# Patient Record
Sex: FEMALE | Race: WHITE | HISPANIC OR LATINO | Employment: OTHER | URBAN - METROPOLITAN AREA
[De-identification: names, ages, dates, MRNs, and addresses within clinical notes are randomized per-mention and may not be internally consistent; named-entity substitution may affect disease eponyms.]

---

## 2017-10-30 RX ORDER — ZOLPIDEM TARTRATE 5 MG/1
5 TABLET ORAL NIGHTLY PRN
COMMUNITY
End: 2017-10-31 | Stop reason: SDUPTHER

## 2017-10-30 RX ORDER — ACETAMINOPHEN AND CODEINE PHOSPHATE 120; 12 MG/5ML; MG/5ML
1 SOLUTION ORAL DAILY
COMMUNITY
End: 2018-04-23

## 2017-10-30 RX ORDER — VENLAFAXINE 37.5 MG/1
37.5 TABLET ORAL 2 TIMES DAILY
COMMUNITY
End: 2017-10-31

## 2017-10-31 ENCOUNTER — OFFICE VISIT (OUTPATIENT)
Dept: FAMILY MEDICINE | Facility: CLINIC | Age: 52
End: 2017-10-31
Payer: MEDICAID

## 2017-10-31 VITALS
SYSTOLIC BLOOD PRESSURE: 112 MMHG | BODY MASS INDEX: 21.53 KG/M2 | RESPIRATION RATE: 18 BRPM | HEIGHT: 59 IN | TEMPERATURE: 98 F | OXYGEN SATURATION: 100 % | WEIGHT: 106.81 LBS | DIASTOLIC BLOOD PRESSURE: 70 MMHG | HEART RATE: 91 BPM

## 2017-10-31 DIAGNOSIS — N02.9 IDIOPATHIC HEMATURIA, UNSPECIFIED WHETHER GLOMERULAR MORPHOLOGIC CHANGES PRESENT: Primary | ICD-10-CM

## 2017-10-31 DIAGNOSIS — F51.04 CHRONIC INSOMNIA: ICD-10-CM

## 2017-10-31 DIAGNOSIS — N95.1 PERIMENOPAUSE: ICD-10-CM

## 2017-10-31 DIAGNOSIS — R30.0 DYSURIA: ICD-10-CM

## 2017-10-31 DIAGNOSIS — N20.0 LEFT NEPHROLITHIASIS: ICD-10-CM

## 2017-10-31 LAB
BILIRUB SERPL-MCNC: NORMAL MG/DL
BLOOD URINE, POC: NORMAL
COLOR, POC UA: NORMAL
GLUCOSE UR QL STRIP: NORMAL
KETONES UR QL STRIP: NORMAL
LEUKOCYTE ESTERASE URINE, POC: NORMAL
NITRITE, POC UA: NORMAL
PH, POC UA: 5
PROTEIN, POC: NORMAL
SPECIFIC GRAVITY, POC UA: 1.03
UROBILINOGEN, POC UA: 0.2

## 2017-10-31 PROCEDURE — 99203 OFFICE O/P NEW LOW 30 MIN: CPT | Mod: 25,,, | Performed by: INTERNAL MEDICINE

## 2017-10-31 PROCEDURE — 81002 URINALYSIS NONAUTO W/O SCOPE: CPT | Mod: ,,, | Performed by: INTERNAL MEDICINE

## 2017-10-31 RX ORDER — ZOLPIDEM TARTRATE 5 MG/1
5 TABLET ORAL NIGHTLY PRN
Qty: 30 TABLET | Refills: 5 | Status: SHIPPED | OUTPATIENT
Start: 2017-10-31 | End: 2018-08-31 | Stop reason: SDUPTHER

## 2017-10-31 NOTE — ASSESSMENT & PLAN NOTE
New renal u/s ordered to check for passage of renal stone. Pt referred to Dr. Tate for further evaluation.

## 2017-10-31 NOTE — PROGRESS NOTES
NEW ADULT PATIENT NOTE    Subjective:     Chief Complaint:  Medication Refill and Dysuria      History of Present Illness:   Tatyana Bello is a 52 y.o. female who presents to establish care in this clinic, though she is well known to me from my previous practice.   Hematuria/dysuria- Pt has had recurrent UTIs with hematuria this year.  U/s done 4/2017 showed L non-obstructing kidney stone. Referred to Dr. Pierce who per pt treated her with another round of antibiotics but did not due cystoscopy or other evaluation. About 1 month ago she had dysuria and went to urgent care, was treated with cipro x 5 days.  She still has mild dysuria off and on.  No flank pain.  Dr. Early is retiring, pt needs new urology referral.     Past Medical History:   Diagnosis Date    Insomnia        Family History   Problem Relation Age of Onset    Stroke Father     Breast cancer Sister     Bone cancer Sister     Colon cancer Brother     Diabetes Mother     Hypertension Mother     Stroke Maternal Grandmother     Stroke Maternal Grandfather        Social History     Social History    Marital status: Unknown     Spouse name: N/A    Number of children: 1    Years of education: N/A     Occupational History    Not on file.     Social History Main Topics    Smoking status: Current Some Day Smoker    Smokeless tobacco: Never Used    Alcohol use Yes    Drug use: No    Sexual activity: Yes     Partners: Male     Other Topics Concern    Not on file     Social History Narrative    No narrative on file       ROS:  Review of Systems   Constitutional: Negative for activity change, appetite change, fatigue, fever and unexpected weight change.   Respiratory: Negative for chest tightness and shortness of breath.    Cardiovascular: Negative for chest pain, palpitations and leg swelling.   Gastrointestinal: Negative for abdominal pain, constipation, diarrhea and nausea.   Genitourinary:  "Positive for dysuria and hematuria. Negative for flank pain, frequency, urgency, vaginal bleeding and vaginal discharge.   Musculoskeletal: Negative for arthralgias and myalgias.   Skin: Negative for rash.          Current Outpatient Prescriptions:     norethindrone (MICRONOR) 0.35 mg tablet, Take 1 tablet by mouth once daily., Disp: , Rfl:     zolpidem (AMBIEN) 5 MG Tab, Take 1 tablet (5 mg total) by mouth nightly as needed., Disp: 30 tablet, Rfl: 5        Objective:       Physical Examination:     /70 (BP Location: Right arm, Patient Position: Sitting, BP Method: Medium (Manual))   Pulse 91   Temp 98.2 °F (36.8 °C) (Oral)   Resp 18   Ht 4' 11" (1.499 m)   Wt 48.4 kg (106 lb 12.8 oz)   LMP 10/24/2017   SpO2 100%   BMI 21.57 kg/m²     Physical Exam      Assessment/Plan:   Tatyana is a 52 y.o. female here to establish care.     Problem List Items Addressed This Visit        Renal/    Dysuria    Current Assessment & Plan     Urine dip showed only blood. Will send for culture.          Relevant Orders    POCT URINE DIPSTICK WITHOUT MICROSCOPE (Completed)    Urine culture    Perimenopause    Current Assessment & Plan     Continue micronor. F/u with OBGYN.         Left nephrolithiasis    Current Assessment & Plan     New renal u/s ordered to check for passage of renal stone. Pt referred to Dr. Tate for further evaluation.          Relevant Orders    Ambulatory referral to Urology    US Retroperitoneal Complete (Kidney and    Idiopathic hematuria - Primary    Current Assessment & Plan     Plan as above.         Relevant Orders    Ambulatory referral to Urology    US Retroperitoneal Complete (Kidney and       Other    Chronic insomnia    Current Assessment & Plan     Continue ambien.         Relevant Medications    zolpidem (AMBIEN) 5 MG Tab          Health Maintenance   Topic Date Due    Lipid Panel  1967    TETANUS VACCINE  05/11/1985    Pneumococcal PPSV23 (Medium Risk) (1) 05/11/1985    " Colonoscopy  05/11/2017    Mammogram  09/20/2019    Pap Smear with HPV Cotest  09/20/2020    Influenza Vaccine  Addressed       Discussion:     Return in about 6 months (around 4/30/2018) for f/u insomnia, hematuria.    Goals     None          Electronically signed by Ruthie Carlos

## 2017-12-05 ENCOUNTER — OFFICE VISIT (OUTPATIENT)
Dept: UROLOGY | Facility: CLINIC | Age: 52
End: 2017-12-05
Payer: MEDICAID

## 2017-12-05 ENCOUNTER — LAB VISIT (OUTPATIENT)
Dept: LAB | Facility: HOSPITAL | Age: 52
End: 2017-12-05
Attending: UROLOGY
Payer: MEDICAID

## 2017-12-05 VITALS
HEART RATE: 96 BPM | WEIGHT: 107.25 LBS | BODY MASS INDEX: 21.06 KG/M2 | RESPIRATION RATE: 18 BRPM | SYSTOLIC BLOOD PRESSURE: 111 MMHG | HEIGHT: 60 IN | DIASTOLIC BLOOD PRESSURE: 76 MMHG

## 2017-12-05 DIAGNOSIS — R31.29 MICROSCOPIC HEMATURIA: ICD-10-CM

## 2017-12-05 DIAGNOSIS — R31.29 MICROSCOPIC HEMATURIA: Primary | ICD-10-CM

## 2017-12-05 DIAGNOSIS — Z87.898 HISTORY OF GROSS HEMATURIA: ICD-10-CM

## 2017-12-05 DIAGNOSIS — M54.31 SCIATICA OF RIGHT SIDE: ICD-10-CM

## 2017-12-05 LAB
ANION GAP SERPL CALC-SCNC: 11 MMOL/L
BUN SERPL-MCNC: 13 MG/DL
CALCIUM SERPL-MCNC: 9.1 MG/DL
CHLORIDE SERPL-SCNC: 102 MMOL/L
CO2 SERPL-SCNC: 24 MMOL/L
CREAT SERPL-MCNC: 0.7 MG/DL
EST. GFR  (AFRICAN AMERICAN): >60 ML/MIN/1.73 M^2
EST. GFR  (NON AFRICAN AMERICAN): >60 ML/MIN/1.73 M^2
GLUCOSE SERPL-MCNC: 73 MG/DL
POTASSIUM SERPL-SCNC: 3.7 MMOL/L
SODIUM SERPL-SCNC: 137 MMOL/L

## 2017-12-05 PROCEDURE — 99204 OFFICE O/P NEW MOD 45 MIN: CPT | Mod: PBBFAC,PN | Performed by: UROLOGY

## 2017-12-05 PROCEDURE — 87086 URINE CULTURE/COLONY COUNT: CPT

## 2017-12-05 PROCEDURE — 99999 PR PBB SHADOW E&M-NEW PATIENT-LVL IV: CPT | Mod: PBBFAC,,, | Performed by: UROLOGY

## 2017-12-05 PROCEDURE — 88112 CYTOPATH CELL ENHANCE TECH: CPT | Mod: 26,,, | Performed by: PATHOLOGY

## 2017-12-05 PROCEDURE — 99204 OFFICE O/P NEW MOD 45 MIN: CPT | Mod: S$PBB,,, | Performed by: UROLOGY

## 2017-12-05 PROCEDURE — 88112 CYTOPATH CELL ENHANCE TECH: CPT | Performed by: PATHOLOGY

## 2017-12-05 PROCEDURE — 36415 COLL VENOUS BLD VENIPUNCTURE: CPT

## 2017-12-05 PROCEDURE — 80048 BASIC METABOLIC PNL TOTAL CA: CPT

## 2017-12-05 NOTE — LETTER
December 9, 2017      Ruthie Carlos MD  [address]  [place] [address]           [place] [facility]  [address] [place] [address] [address]  [place] [address]  Phone: [phone]  Fax: [phone]          Patient: Tatyana Bello   MR Number: [id number]   YOB: 1965   Date of Visit: 12/5/2017       Dear Dr. Ruthie Carlos:    Thank you for referring Tatyana Bello to me for evaluation. Attached you will find relevant portions of my assessment and plan of care.    If you have questions, please do not hesitate to call me. I look forward to following Tatyana Bello along with you.    Sincerely,    Lester Daigle MD    Enclosure  CC:  No Recipients    If you would like to receive this communication electronically, please contact [email or web address] or [phone] to request more information on [employer] access.    For providers and/or their staff who would like to refer a patient to [facility], please contact us through our one-stop-shop provider referral line, [facility], at [phone].    If you feel you have received this communication in error or would no longer like to receive these types of communications, please e-mail [email or web address]

## 2017-12-05 NOTE — PROGRESS NOTES
[facility] New Patient/H&P:    Tatyana Bello is a 52 y.o. female referred by Dr Ruthie Carlos for evaluation of left nephrolithiasis and microhematuria    She had previously been followed by Dr Stan Early.  Last seen 7/21/17 with occasional L back pain, UA with 6-10 rbcs. Advised cystoscopy  Was seen intially 6/13/17 at referral of Dr Carlos for possible kidney stone. At that time had left buttock pain, no voiding complaints. UA 4-6 rbcs.  Had ABY at [facility] with small non-shadowing echogenic foci at corticomedullary junction mid to lower L kidney which likely represent small nonobst calculi. No left hydro though left jet not visualized.    She has been a patient of Dr Carlos's for a while, though was recently seen 10/31/17 in her new [facility] practice and noted:  Hematuria/dysuria- Pt has had recurrent UTIs with hematuria this year.  U/s done 4/2017 showed L non-obstructing kidney stone. Referred to Dr. Pierce who per pt treated her with another round of antibiotics but did not due cystoscopy or other evaluation. About 1 month ago she had dysuria and went to urgent care, was treated with cipro x 5 days.  She still has mild dysuria off and on.  No flank pain.  Dr. Early is retiring, pt needs new urology referral  UA negative, Ucx negative.  Repeat renal US performed 11/7/17 without abnormality of concern for stones  The kidneys are normal in size and echogenicity. The right kidney measures 11.4 x 4.3 x 4.1 cm. The left kidney measures 10.4 x 5.0 x 5.6 cm. There is no hydronephrosis. The bladder is normal.    1 pack per week smoker resumed for 1 year, quit for 10 years, prior to which she smoked about 20 years at 1 ppd.  Sister had kidney stones, has never personally had a stone.  Last UTI September 2017 with dysuria and frequency and patient reports significant gross hematuria during that episode only, for which she went to an urgent care and was treated with antibiotics and gross hematuria resolved, but on follow  up with Dr Carlos still detected blood in her urine.   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      Past Medical History:   Diagnosis Date    Insomnia        History reviewed. No pertinent surgical history.    Family History   Problem Relation Age of Onset    Stroke Father     Breast cancer Sister     Bone cancer Sister     Colon cancer Brother     Diabetes Mother     Hypertension Mother     Stroke Maternal Grandmother     Stroke Maternal Grandfather        Social History     Social History    Marital status:      Spouse name: N/A    Number of children: 1    Years of education: N/A     Occupational History    Not on file.     Social History Main Topics    Smoking status: Current Some Day Smoker    Smokeless tobacco: Never Used    Alcohol use Yes    Drug use: No    Sexual activity: Yes     Partners: Male     Other Topics Concern    Not on file     Social History Narrative    No narrative on file       Review of patient's allergies indicates:  No Known Allergies    Medications Reviewed: see MAR    ROS:    Constitutional: denies fevers, chills, night sweats, fatigue, malaise  Respiratory: negative for cough, shortness of breath, wheezing, dyspnea.  Cardiovascular:  negative for chest pain, varicose veins, ankle swelling, palpitations, syncope.  GI: negative for abdominal pain, heartburn, indigestion, nausea, vomiting, constipation, diarrhea, blood in stool.   Urology: as noted above in HPI  Endocrinology: negative for cold intolerance, excessive thirst, not feeling tired/sluggish, no heat intolerance.   Hematology/Lymph: negative for easy bleeding, easy bruising, swollen glands.  Musculoskeletal: POSITIVE for back pain, negative for joint pain, joint swelling, neck pain.  Allergy-Immunology: negative for seasonal allergies, negative for unusual infections.   Skin: negative  for boils, breast lumps, hives, itching, rash.   Neurology: negative for, dizziness, headache, tingling/numbness, tremors.   Psych: satisfied with life; negative for, anxiety, depression, suicidal thoughts.     PHYSICAL EXAM:    Vitals:    12/05/17 0851   BP: 111/76   Pulse: 96   Resp: 18     Body mass index is 20.95 kg/m². Weight: 48.6 kg (107 lb 4.1 oz) Height: 5' (152.4 cm)       General: Alert, cooperative, no distress, appears stated age  Head: Normocephalic, without obvious abnormality, atraumatic  Neck: no masses, no thyromegaly, no lymphadenopathy  Eyes: PERRL, conjunctiva/corneas clear  Lungs: Respirations unlabored, normal effort, no accessory muscle use  CV: Warm and well perfused extremities  Abdomen: Soft, non-tender, no CVA tenderness, no hepatosplenomegaly, no hernia  Back: Bilateral lower back TTP, especially at si joints, with reproducible right SI joint pain/ttp eliciting sciatica  Extremities: Extremities normal, atraumatic, no cyanosis or edema  Skin: Normal color, texture, and turgor, no rashes or lesions  Psych: Appropriate, well oriented, normal affect, normal mood  Neuro: Non-focal      Assessment/Diagnosis:    1. Microscopic hematuria  Urine culture    Basic metabolic panel    CT Urogram Abd Pelvis W WO    Case Request Operating Room: CYSTOSCOPY    Place in Outpatient    Vital Signs    2. History of gross hematuria  Cytology, urine    CT Urogram Abd Pelvis W WO    Case Request Operating Room: CYSTOSCOPY    Place in Outpatient    Vital Signs    3. Sciatica of right side         Plans:   She did have recent gross hematuria, and symptoms of UTI.  Most recent urine culture was negative.  Have no documentation of actual UTI and patient is unsure which urgent care she went to to request records from.  Does report UTIs in the past from Dr. Carlos's previous practice.  Records not available.  In any case, with her recent history of gross hematuria and persistent microscopic hematuria, with dipstick  positivity for blood today, we did discuss hematuria workup.  Urine will be sent for culture and cytology, after which we'll perform a CT urogram.  This will also assess for any kidney stone burden, as her only evidence of possible kidney stones has been a past ultrasound, which most recent ultrasound was negative.  Did discuss ultrasound is not very sensitive for stones, though CT is.  The contrasted phases of her CT were also clear the remainder of her upper tracts.  Given both gross and microscopic hematuria will be then necessary to perform cystoscopic evaluation of lower tract to clear bladder and urethra of any underlying etiology of her hematuria.  Diagnostic flexible cystoscopy described in detail, and scheduled 1/2/18 at [facility].  Time requested: 3 PM secondary to work schedule    We did also discuss that her back pain is likely unrelated to kidney stones.  She has no evidence of stones on recent ultrasound.  As well, she has bilateral lower back pain more consistent with SI joint pain, and reproducible pain in this area on physical examination which elicited sciatica.  I advised to further discuss her low back pain with sciatica with Dr. Carlos, as further evaluation and referral to physical medicine and rehabilitation may be necessary.

## 2017-12-06 LAB — BACTERIA UR CULT: NO GROWTH

## 2017-12-19 ENCOUNTER — HOSPITAL ENCOUNTER (OUTPATIENT)
Dept: RADIOLOGY | Facility: HOSPITAL | Age: 52
Discharge: HOME | End: 2017-12-19
Attending: UROLOGY
Payer: MEDICAID

## 2017-12-19 DIAGNOSIS — R31.29 MICROSCOPIC HEMATURIA: ICD-10-CM

## 2017-12-19 DIAGNOSIS — Z87.898 HISTORY OF GROSS HEMATURIA: ICD-10-CM

## 2017-12-19 PROCEDURE — 74178 CT ABD&PLV WO CNTR FLWD CNTR: CPT | Mod: 26,,, | Performed by: RADIOLOGY

## 2017-12-19 PROCEDURE — 25500020 PHARM REV CODE 255

## 2017-12-19 PROCEDURE — 74178 CT ABD&PLV WO CNTR FLWD CNTR: CPT | Mod: TC

## 2017-12-19 RX ADMIN — IOHEXOL 75 ML: 350 INJECTION, SOLUTION INTRAVENOUS at 03:12

## 2018-01-02 ENCOUNTER — HOSPITAL ENCOUNTER (OUTPATIENT)
Facility: AMBULARY SURGERY CENTER | Age: 53
Discharge: HOME | End: 2018-01-02
Attending: UROLOGY | Admitting: UROLOGY
Payer: MEDICAID

## 2018-01-02 DIAGNOSIS — Z87.898 HISTORY OF GROSS HEMATURIA: ICD-10-CM

## 2018-01-02 DIAGNOSIS — R31.29 MICROSCOPIC HEMATURIA: ICD-10-CM

## 2018-01-02 PROCEDURE — 52000 CYSTOURETHROSCOPY: CPT | Mod: ,,, | Performed by: UROLOGY

## 2018-01-02 PROCEDURE — 52005 CYSTO W/URTRL CATHJ: CPT | Performed by: UROLOGY

## 2018-01-02 PROCEDURE — 52000 CYSTOURETHROSCOPY: CPT | Performed by: UROLOGY

## 2018-01-02 RX ORDER — WATER 1 ML/ML
IRRIGANT IRRIGATION
Status: DISCONTINUED | OUTPATIENT
Start: 2018-01-02 | End: 2018-01-02 | Stop reason: HOSPADM

## 2018-01-02 RX ORDER — LIDOCAINE HYDROCHLORIDE 20 MG/ML
JELLY TOPICAL
Status: DISCONTINUED
Start: 2018-01-02 | End: 2018-01-02 | Stop reason: HOSPADM

## 2018-01-02 RX ORDER — NITROFURANTOIN MACROCRYSTALS 50 MG/1
50 CAPSULE ORAL DAILY
Qty: 90 CAPSULE | Refills: 0 | Status: SHIPPED | OUTPATIENT
Start: 2018-01-02 | End: 2018-04-23

## 2018-01-02 RX ORDER — LIDOCAINE HYDROCHLORIDE 20 MG/ML
JELLY TOPICAL
Status: DISCONTINUED | OUTPATIENT
Start: 2018-01-02 | End: 2018-01-02 | Stop reason: HOSPADM

## 2018-01-02 NOTE — H&P (VIEW-ONLY)
[facility] New Patient/H&P:    Tatyana Bello is a 52 y.o. female referred by Dr Ruthie Carlos for evaluation of left nephrolithiasis and microhematuria    She had previously been followed by Dr Stan Early.  Last seen 7/21/17 with occasional L back pain, UA with 6-10 rbcs. Advised cystoscopy  Was seen intially 6/13/17 at referral of Dr Carlos for possible kidney stone. At that time had left buttock pain, no voiding complaints. UA 4-6 rbcs.  Had ABY at [facility] with small non-shadowing echogenic foci at corticomedullary junction mid to lower L kidney which likely represent small nonobst calculi. No left hydro though left jet not visualized.    She has been a patient of Dr Carlos's for a while, though was recently seen 10/31/17 in her new [facility] practice and noted:  Hematuria/dysuria- Pt has had recurrent UTIs with hematuria this year.  U/s done 4/2017 showed L non-obstructing kidney stone. Referred to Dr. Pierce who per pt treated her with another round of antibiotics but did not due cystoscopy or other evaluation. About 1 month ago she had dysuria and went to urgent care, was treated with cipro x 5 days.  She still has mild dysuria off and on.  No flank pain.  Dr. Early is retiring, pt needs new urology referral  UA negative, Ucx negative.  Repeat renal US performed 11/7/17 without abnormality of concern for stones  The kidneys are normal in size and echogenicity. The right kidney measures 11.4 x 4.3 x 4.1 cm. The left kidney measures 10.4 x 5.0 x 5.6 cm. There is no hydronephrosis. The bladder is normal.    1 pack per week smoker resumed for 1 year, quit for 10 years, prior to which she smoked about 20 years at 1 ppd.  Sister had kidney stones, has never personally had a stone.  Last UTI September 2017 with dysuria and frequency and patient reports significant gross hematuria during that episode only, for which she went to an urgent care and was treated with antibiotics and gross hematuria resolved, but on follow  up with Dr Carlos still detected blood in her urine.   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      Past Medical History:   Diagnosis Date    Insomnia        History reviewed. No pertinent surgical history.    Family History   Problem Relation Age of Onset    Stroke Father     Breast cancer Sister     Bone cancer Sister     Colon cancer Brother     Diabetes Mother     Hypertension Mother     Stroke Maternal Grandmother     Stroke Maternal Grandfather        Social History     Social History    Marital status:      Spouse name: N/A    Number of children: 1    Years of education: N/A     Occupational History    Not on file.     Social History Main Topics    Smoking status: Current Some Day Smoker    Smokeless tobacco: Never Used    Alcohol use Yes    Drug use: No    Sexual activity: Yes     Partners: Male     Other Topics Concern    Not on file     Social History Narrative    No narrative on file       Review of patient's allergies indicates:  No Known Allergies    Medications Reviewed: see MAR    ROS:    Constitutional: denies fevers, chills, night sweats, fatigue, malaise  Respiratory: negative for cough, shortness of breath, wheezing, dyspnea.  Cardiovascular:  negative for chest pain, varicose veins, ankle swelling, palpitations, syncope.  GI: negative for abdominal pain, heartburn, indigestion, nausea, vomiting, constipation, diarrhea, blood in stool.   Urology: as noted above in HPI  Endocrinology: negative for cold intolerance, excessive thirst, not feeling tired/sluggish, no heat intolerance.   Hematology/Lymph: negative for easy bleeding, easy bruising, swollen glands.  Musculoskeletal: POSITIVE for back pain, negative for joint pain, joint swelling, neck pain.  Allergy-Immunology: negative for seasonal allergies, negative for unusual infections.   Skin: negative  for boils, breast lumps, hives, itching, rash.   Neurology: negative for, dizziness, headache, tingling/numbness, tremors.   Psych: satisfied with life; negative for, anxiety, depression, suicidal thoughts.     PHYSICAL EXAM:    Vitals:    12/05/17 0851   BP: 111/76   Pulse: 96   Resp: 18     Body mass index is 20.95 kg/m². Weight: 48.6 kg (107 lb 4.1 oz) Height: 5' (152.4 cm)       General: Alert, cooperative, no distress, appears stated age  Head: Normocephalic, without obvious abnormality, atraumatic  Neck: no masses, no thyromegaly, no lymphadenopathy  Eyes: PERRL, conjunctiva/corneas clear  Lungs: Respirations unlabored, normal effort, no accessory muscle use  CV: Warm and well perfused extremities  Abdomen: Soft, non-tender, no CVA tenderness, no hepatosplenomegaly, no hernia  Back: Bilateral lower back TTP, especially at si joints, with reproducible right SI joint pain/ttp eliciting sciatica  Extremities: Extremities normal, atraumatic, no cyanosis or edema  Skin: Normal color, texture, and turgor, no rashes or lesions  Psych: Appropriate, well oriented, normal affect, normal mood  Neuro: Non-focal      Assessment/Diagnosis:    1. Microscopic hematuria  Urine culture    Basic metabolic panel    CT Urogram Abd Pelvis W WO    Case Request Operating Room: CYSTOSCOPY    Place in Outpatient    Vital Signs    2. History of gross hematuria  Cytology, urine    CT Urogram Abd Pelvis W WO    Case Request Operating Room: CYSTOSCOPY    Place in Outpatient    Vital Signs    3. Sciatica of right side         Plans:   She did have recent gross hematuria, and symptoms of UTI.  Most recent urine culture was negative.  Have no documentation of actual UTI and patient is unsure which urgent care she went to to request records from.  Does report UTIs in the past from Dr. Carlos's previous practice.  Records not available.  In any case, with her recent history of gross hematuria and persistent microscopic hematuria, with dipstick  positivity for blood today, we did discuss hematuria workup.  Urine will be sent for culture and cytology, after which we'll perform a CT urogram.  This will also assess for any kidney stone burden, as her only evidence of possible kidney stones has been a past ultrasound, which most recent ultrasound was negative.  Did discuss ultrasound is not very sensitive for stones, though CT is.  The contrasted phases of her CT were also clear the remainder of her upper tracts.  Given both gross and microscopic hematuria will be then necessary to perform cystoscopic evaluation of lower tract to clear bladder and urethra of any underlying etiology of her hematuria.  Diagnostic flexible cystoscopy described in detail, and scheduled 1/2/18 at [facility].  Time requested: 3 PM secondary to work schedule    We did also discuss that her back pain is likely unrelated to kidney stones.  She has no evidence of stones on recent ultrasound.  As well, she has bilateral lower back pain more consistent with SI joint pain, and reproducible pain in this area on physical examination which elicited sciatica.  I advised to further discuss her low back pain with sciatica with Dr. Carlos, as further evaluation and referral to physical medicine and rehabilitation may be necessary.

## 2018-01-02 NOTE — PLAN OF CARE
Patient does not want anything to drink at this time. She spoke with Dr Daigle prior to discharge. She is aware that her antibiotics are at [facility] and should be taken twice a day for the next 2 days.

## 2018-01-02 NOTE — INTERVAL H&P NOTE
The patient has been examined and the H&P has been reviewed:    CT Ucx cytology negative  Did have first episode GH in last week      Anesthesia/Surgery risks, benefits and alternative options discussed and understood by patient/family.  Pt is acceptable candidate for procedure at [facility]        Active Hospital Problems    Diagnosis  POA    Microscopic hematuria [R31.29]  Yes      Resolved Hospital Problems    Diagnosis Date Resolved POA   No resolved problems to display.

## 2018-01-03 VITALS
TEMPERATURE: 98 F | HEIGHT: 60 IN | OXYGEN SATURATION: 100 % | SYSTOLIC BLOOD PRESSURE: 119 MMHG | WEIGHT: 107 LBS | HEART RATE: 75 BPM | BODY MASS INDEX: 21.01 KG/M2 | DIASTOLIC BLOOD PRESSURE: 74 MMHG | RESPIRATION RATE: 20 BRPM

## 2018-01-03 NOTE — BRIEF OP NOTE
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        CONTINUE taking these medications    norethindrone 0.35 mg tablet  Commonly known as:  MICRONOR     zolpidem 5 MG Tab  Commonly known as:  AMBIEN  Take 1 tablet (5 mg total) by mouth nightly as needed.           Where to Get Your Medications      These medications were sent to [id number] - [place] - [address]  [address] [place] [address]    Phone:  [phone]   · nitrofurantoin 50 MG capsule

## 2018-01-04 NOTE — OP NOTE
[facility] Urology Operative  Note     Date: 01/02/2018     Staff Surgeon: Lester Daigle MD     Pre-Op Diagnosis: microhematuria     Post-Op Diagnosis: microhematuria     Procedure(s) Performed:   Cystoscopy, flexible  In-and-out catheterization     Specimen(s): none     Anesthesia: local urojet lidocaine 2% jelly     Findings: mild meatal narrowing, scattered punctate cystitic cystica, and trigonitis with squamous metaplasia at bladder neck/trigone     Estimated Blood Loss: none     Drains: none     Complications: none    Indications for procedure:  51 yo F with reported history of UTIs who most recently was experiencing dysuria and culture negative though had continued microhematuria. More recently she has noted one episode gross hematuria, and some dyspareunia. No dribbling or incontinence.  CT, culture, and cytology negative. Here today for cystoscopic evaluation     Procedure in detail:  After informed consent, the patient was prepped and drapped in standard  cystoscopic fashion and 2% xylocaine jelly was used to anesthetize the urethra. A flexible cystoscope was passed into the bladder via the urethra. Urethral meatus orthotopic and mildly narrow, and scope had some difficulty traversing in. A 14 fr red rubber ramesh therefore was lubricated and noted to pass easily into bladder, after which scope was passed with deflection up and able to traverse into bladder.      Urethra appeared normal within, without any lesions or abnormalities. Bilateral ureteral orifices in orthotopic position on the trigone with clear efflux bilaterally.     The bladder was then systematically inspected. The bladder mucosa of the lateral walls was free of any discrete tumors, or ulcerations. There was however some scattered punctate brown lesions consistent with cystitis cystica throughout bladder mucosa. She did have some trigonitis and changes near bladder neck consistent with squamous metaplasis with surrounding erythema, which was  best seen on retroflexion.   Bladder neck with good coaptation     She tolerated the procedure well with no complications.       Disposition:   Given history of UTIs and dysuria with hematuria and inflammatory bladder findings, discussed 3 month course of low dose daily suppressive abx, so nitrofurantoin 50mg Rxed. Will take BID for 2d as post procedure prophylaxis. RTC 3 mos after completion of abx. If UTI sxs in interim, has been instructed to present to clinic for nurse visit for cathd urine culture to test for true breakthrough on abx requiring further management.

## 2018-03-19 ENCOUNTER — CLINICAL SUPPORT (OUTPATIENT)
Dept: SMOKING CESSATION | Facility: CLINIC | Age: 53
End: 2018-03-19
Payer: COMMERCIAL

## 2018-03-19 DIAGNOSIS — F17.200 NICOTINE DEPENDENCE: Primary | ICD-10-CM

## 2018-03-19 PROCEDURE — 99404 PREV MED CNSL INDIV APPRX 60: CPT | Mod: S$GLB,,,

## 2018-03-19 PROCEDURE — 99999 PR PBB SHADOW E&M-EST. PATIENT-LVL I: CPT | Mod: PBBFAC,,,

## 2018-03-21 ENCOUNTER — TELEPHONE (OUTPATIENT)
Dept: SMOKING CESSATION | Facility: CLINIC | Age: 53
End: 2018-03-21

## 2018-03-21 RX ORDER — VARENICLINE TARTRATE 0.5 (11)-1
KIT ORAL
Qty: 1 PACKAGE | Refills: 0 | Status: SHIPPED | OUTPATIENT
Start: 2018-03-21 | End: 2018-04-16 | Stop reason: DRUGHIGH

## 2018-03-21 NOTE — TELEPHONE ENCOUNTER
I called the patient today to make her aware that I had received the PA for her Chantix prescription. The prescription was sent to [facility], which they indicated was in network. I had to leave a message at  this time.

## 2018-04-02 ENCOUNTER — CLINICAL SUPPORT (OUTPATIENT)
Dept: SMOKING CESSATION | Facility: CLINIC | Age: 53
End: 2018-04-02
Payer: COMMERCIAL

## 2018-04-02 DIAGNOSIS — F17.200 NICOTINE DEPENDENCE: Primary | ICD-10-CM

## 2018-04-02 PROCEDURE — 90853 GROUP PSYCHOTHERAPY: CPT | Mod: S$GLB,,,

## 2018-04-02 PROCEDURE — 99999 PR PBB SHADOW E&M-EST. PATIENT-LVL I: CPT | Mod: PBBFAC,,,

## 2018-04-02 NOTE — Clinical Note
Patient is smoking just a 3-4 cigarettes per day, mostly when bored & lonely in the evening.The patient remains on the prescribed tobacco cessation medication regimen of 1 mg Chantix BID without any negative side effects at this time. The patient will continue to come to the clinic for additional support and encouragement.

## 2018-04-04 NOTE — PROGRESS NOTES
Site: [facility]  Date:  4/2/2018  Clinical Status of Patient: Outpatient   Length of Service and Code: 60 minutes - 61626   Number in Attendance: 2  Group Activities/Focus of Group:  orientation, client introductions, completion of TCRS (Tobacco Cessation Rating Scale) learned addiction model, cues/triggers, personal reasons for quitting, medications, goals, quit date    Target symptoms:  withdrawal and medication side effects             The following were rated moderate (3) to severe (4) on TCRS:       Moderate 3: desire or crave, restless - discussed withdrawal and habit      Severe 4:   none  Patient's Response to Intervention: Patient is smoking just a 3-4 cigarettes per day, mostly when bored & lonely in the evening.The patient remains on the prescribed tobacco cessation medication regimen of 1 mg Chantix BID without any negative side effects at this time. The patient will continue to come to the clinic for additional support and encouragement.     Progress Toward Goals and Other Mental Status Changes: The patient denies any abnormal behavioral or mental changes at this time.   Diagnosis: Z72.0  Plan: The patient will continue with group therapy sessions and medication monitoring by CTTS. Prescribed medication management will be by physician.   Return to Clinic: 1 week

## 2018-04-16 ENCOUNTER — CLINICAL SUPPORT (OUTPATIENT)
Dept: SMOKING CESSATION | Facility: CLINIC | Age: 53
End: 2018-04-16
Payer: COMMERCIAL

## 2018-04-16 DIAGNOSIS — F17.200 NICOTINE DEPENDENCE: Primary | ICD-10-CM

## 2018-04-16 PROCEDURE — 99407 BEHAV CHNG SMOKING > 10 MIN: CPT | Mod: S$GLB,,,

## 2018-04-16 PROCEDURE — 99999 PR PBB SHADOW E&M-EST. PATIENT-LVL I: CPT | Mod: PBBFAC,,,

## 2018-04-16 RX ORDER — VARENICLINE TARTRATE 1 MG/1
1 TABLET, FILM COATED ORAL 2 TIMES DAILY
Qty: 60 TABLET | Refills: 0 | Status: SHIPPED | OUTPATIENT
Start: 2018-04-21 | End: 2018-05-21

## 2018-04-23 ENCOUNTER — CLINICAL SUPPORT (OUTPATIENT)
Dept: SMOKING CESSATION | Facility: CLINIC | Age: 53
End: 2018-04-23
Payer: COMMERCIAL

## 2018-04-23 ENCOUNTER — OFFICE VISIT (OUTPATIENT)
Dept: UROLOGY | Facility: CLINIC | Age: 53
End: 2018-04-23
Payer: MEDICAID

## 2018-04-23 VITALS
DIASTOLIC BLOOD PRESSURE: 73 MMHG | RESPIRATION RATE: 18 BRPM | HEART RATE: 79 BPM | BODY MASS INDEX: 21.01 KG/M2 | SYSTOLIC BLOOD PRESSURE: 108 MMHG | WEIGHT: 107 LBS | HEIGHT: 60 IN

## 2018-04-23 DIAGNOSIS — F17.200 NICOTINE DEPENDENCE: Primary | ICD-10-CM

## 2018-04-23 DIAGNOSIS — R31.29 MICROSCOPIC HEMATURIA: Primary | ICD-10-CM

## 2018-04-23 LAB
BILIRUB SERPL-MCNC: ABNORMAL MG/DL
BLOOD URINE, POC: 50
COLOR, POC UA: CLEAR
GLUCOSE UR QL STRIP: ABNORMAL
KETONES UR QL STRIP: ABNORMAL
LEUKOCYTE ESTERASE URINE, POC: ABNORMAL
NITRITE, POC UA: ABNORMAL
PH, POC UA: 5
PROTEIN, POC: ABNORMAL
SPECIFIC GRAVITY, POC UA: 1.02
UROBILINOGEN, POC UA: ABNORMAL

## 2018-04-23 PROCEDURE — 99213 OFFICE O/P EST LOW 20 MIN: CPT | Mod: S$PBB,,, | Performed by: UROLOGY

## 2018-04-23 PROCEDURE — 90853 GROUP PSYCHOTHERAPY: CPT | Mod: S$GLB,,,

## 2018-04-23 PROCEDURE — 99999 PR PBB SHADOW E&M-EST. PATIENT-LVL III: CPT | Mod: PBBFAC,,, | Performed by: UROLOGY

## 2018-04-23 PROCEDURE — 87086 URINE CULTURE/COLONY COUNT: CPT

## 2018-04-23 PROCEDURE — 99999 PR PBB SHADOW E&M-EST. PATIENT-LVL I: CPT | Mod: PBBFAC,,,

## 2018-04-23 PROCEDURE — 99213 OFFICE O/P EST LOW 20 MIN: CPT | Mod: PBBFAC,PN | Performed by: UROLOGY

## 2018-04-23 PROCEDURE — 81002 URINALYSIS NONAUTO W/O SCOPE: CPT | Mod: PBBFAC,PN | Performed by: UROLOGY

## 2018-04-23 NOTE — Clinical Note
Patient remains tobacco free since 4/6/2018. She is managing withdrawal symptoms well at this time. She states urges for tobacco are much less frequent and delay and distraction is working well for her. The patient remains on the prescribed tobacco cessation medication regimen of 1/2 mg Chantix BID without any negative side effects at this time. The patient will continue to come to the clinic for additional support and encouragement.

## 2018-04-23 NOTE — PROGRESS NOTES
[facility] Urology Progress Note    Tatyana Bello is a 52 y.o. female who presents for follow-up of recurrent UTI and microhematuria.    51 yo F with reported history of UTIs who most recently was experiencing dysuria and culture negative though had continued microhematuria. More recently she has noted one episode gross hematuria, and some dyspareunia. No dribbling or incontinence.  CT, culture, and cytology negative. Had cystoscopic evaluation 1/3/18:  mild meatal narrowing, scattered punctate cystitic cystica, and trigonitis with squamous metaplasia at bladder neck/trigone  Given history of UTIs and dysuria with hematuria and inflammatory bladder findings, discussed 3 month course of low dose daily suppressive abx, so nitrofurantoin 50mg Rxed.     She returns today having completed her suppressive course of antibiotics.  She has had no further dysuria, hematuria, or UTI symptoms   she has no urinary complaints at this time   urinalysis dipstick is negative except for trace protein and 50 of blood      ROS: A comprehensive 10 system review was performed and is negative except as noted above in HPI    PHYSICAL EXAM:    Vitals:    04/23/18 1547   BP: 108/73   Pulse: 79   Resp: 18     Body mass index is 20.9 kg/m². Weight: 48.5 kg (107 lb) Height: 5' (152.4 cm)       General: Alert, cooperative, no distress, appears stated age   Head: Normocephalic, without obvious abnormality, atraumatic   Eyes: PERRL, conjunctiva/corneas clear   Lungs: Respirations unlabored   Heart: Warm and well perfused   Abdomen:Soft, nontender, nondistended    Extremities: Extremities normal, atraumatic, no cyanosis or edema   Skin: Skin color, texture, turgor normal, no rashes or lesions   Psych: Appropriate   Neurologic: Non-focal       Recent Results (from the past 336 hour(s))   POCT URINE DIPSTICK WITHOUT MICROSCOPE    Collection Time: 04/23/18  3:54 PM   Result Value Ref Range    Color, UA clear     Spec Grav UA 1.025     pH, UA 5.0      WBC, UA neg     Nitrite, UA neg     Protein trace     Glucose, UA neg     Ketones, UA neg     Urobilinogen, UA neg     Bilirubin neg     Blood, UA 50 (A)        ASSESSMENT   1. Microscopic hematuria  POCT URINE DIPSTICK WITHOUT MICROSCOPE    Urine culture       Plan    Doing well without any hematuria recurrence or signs and symptoms of infection.  She did complete a low-dose daily suppressive antibiotic course for her inflammatory bladder findings.  She is a symptomatic at this time and has not had interim infection.  At this time she can follow up on an as-needed basis only. If signs/sxs of infection, Is welcome to return for nurse visit for catheterized specimen to be sent for culture.

## 2018-04-24 LAB — BACTERIA UR CULT: NO GROWTH

## 2018-04-24 NOTE — PROGRESS NOTES
Smoking Cessation Group Session #2    Site: [facility]  Date:  4/23/2018  Clinical Status of Patient: Outpatient   Length of Service and Code: 60 minutes - 47337   Number in Attendance: 2  Group Activities/Focus of Group:  Sharing last weeks challenges, triggers, and coping activities to remain quit and/ or keep making progress toward cessation, completion of TCRS (Tobacco Cessation Rating Scale) learned addiction model, personal reasons for quitting, medications, goals, quit date.    Specific session focus: completion of TCRS (Tobacco Cessation Rating Scale) reviewed strategies, cues, and triggers. Introduced the negative impact of tobacco on health, the health advantages of discontinuing the use of tobacco, time line improved health changes after a quit, withdrawal issues to expect from nicotine and habit, and ways to achieve the goal of a quit.  Target symptoms:  withdrawal and medication side effects             The following were rated moderate (3) to severe (4) on TCRS:       Moderate 3: none     Severe 4:   none  Patient's Response to Intervention: Patient remains tobacco free since 4/6/2018. She is managing withdrawal symptoms well at this time. She states urges for tobacco are much less frequent and delay and distraction is working well for her. The patient remains on the prescribed tobacco cessation medication regimen of 1/2 mg Chantix BID without any negative side effects at this time. The patient will continue to come to the clinic for additional support and encouragement.   Progress Toward Goals and Other Mental Status Changes: The patient denies any abnormal behavioral or mental changes at this time.     Diagnosis: Z72.0  Plan:The patient will continue with group therapy sessions and medication monitoring by CTTS. Prescribed medication management will be by physician.   Return to Clinic: 2 weeks    Quit Date: 4/6/2018   Planned Quit Date:

## 2018-05-01 ENCOUNTER — OFFICE VISIT (OUTPATIENT)
Dept: FAMILY MEDICINE | Facility: CLINIC | Age: 53
End: 2018-05-01
Payer: MEDICAID

## 2018-05-01 VITALS
OXYGEN SATURATION: 98 % | WEIGHT: 105.63 LBS | DIASTOLIC BLOOD PRESSURE: 60 MMHG | HEART RATE: 80 BPM | RESPIRATION RATE: 20 BRPM | BODY MASS INDEX: 20.62 KG/M2 | SYSTOLIC BLOOD PRESSURE: 102 MMHG

## 2018-05-01 DIAGNOSIS — Z13.220 SCREENING, LIPID: ICD-10-CM

## 2018-05-01 DIAGNOSIS — Z13.1 SCREENING FOR DIABETES MELLITUS: ICD-10-CM

## 2018-05-01 DIAGNOSIS — F51.04 CHRONIC INSOMNIA: ICD-10-CM

## 2018-05-01 DIAGNOSIS — N02.9 IDIOPATHIC HEMATURIA, UNSPECIFIED WHETHER GLOMERULAR MORPHOLOGIC CHANGES PRESENT: Primary | ICD-10-CM

## 2018-05-01 PROCEDURE — 99213 OFFICE O/P EST LOW 20 MIN: CPT | Mod: ,,, | Performed by: INTERNAL MEDICINE

## 2018-05-01 RX ORDER — TRAZODONE HYDROCHLORIDE 50 MG/1
50 TABLET ORAL NIGHTLY
Qty: 30 TABLET | Refills: 2 | Status: SHIPPED | OUTPATIENT
Start: 2018-05-01 | End: 2019-02-18

## 2018-05-01 RX ORDER — ACETAMINOPHEN AND CODEINE PHOSPHATE 120; 12 MG/5ML; MG/5ML
1 SOLUTION ORAL DAILY
COMMUNITY
End: 2022-06-20

## 2018-05-01 NOTE — PATIENT INSTRUCTIONS
[facility]   Address: [address] [place] [address]   Hours:   Sunday: Closed  Monday-Friday: 6AM - 5PM  Saturday: Closed    Phone: [phone]

## 2018-05-01 NOTE — PROGRESS NOTES
"                                    ADULT FOLLOW-UP VISIT    Subjective:     Chief Complaint:  Follow-up and Insomnia      51 yo woman here for follow-up. She has been doing well overall. Quit smoking, going to [facility] Smoking Cessation classes with her . Took chantix for 1 week only, has been able to maintain quitting without it. Pt recently saw Dr. Daigle in f/u for microscopic hematuria. She had CT urogram, cystoscopy which did not show any causes for hematuria. She completed 3 months of ppx antibiotics and went back to Dr. Daigle last week, where urinalysis was + for blood.  Pt is unsure why she continues to have blood in her urine.   Pt c/o ambien "not lasting long enough." She falls asleep after taking it but wakes after 2-3 hours.  She has previously tried OTC meds and temazepam for sleep.            Ms. Bello  has a past medical history of Insomnia.    Family history and social history are reviewed and there are no significant changes.     ROS:  Review of Systems   Gastrointestinal: Negative for abdominal pain, blood in stool, nausea and vomiting.   Genitourinary: Negative for decreased urine volume, dysuria, frequency, genital sores, urgency, vaginal bleeding, vaginal discharge and vaginal pain.   Psychiatric/Behavioral: Positive for sleep disturbance. Negative for decreased concentration and dysphoric mood. The patient is not nervous/anxious.           Current Outpatient Prescriptions:     norethindrone (NEETA) 0.35 mg tablet, Neeta 0.35 mg tablet, Disp: , Rfl:     varenicline (CHANTIX) 1 mg Tab, Take 1 tablet (1 mg total) by mouth 2 (two) times daily., Disp: 60 tablet, Rfl: 0    zolpidem (AMBIEN) 5 MG Tab, Take 1 tablet (5 mg total) by mouth nightly as needed., Disp: 30 tablet, Rfl: 5    traZODone (DESYREL) 50 MG tablet, Take 1 tablet (50 mg total) by mouth every evening., Disp: 30 tablet, Rfl: 2      Objective:     Physical Examination:     /60 (BP Location: Left arm, Patient " Position: Sitting, BP Method: Medium (Manual))   Pulse 80   Resp 20   Wt 47.9 kg (105 lb 9.6 oz)   LMP 04/01/2018   SpO2 98%   BMI 20.62 kg/m²     Physical Exam   Constitutional: She appears well-developed and well-nourished. No distress.   HENT:   Head: Normocephalic and atraumatic.   Nose: Nose normal.   Mouth/Throat: Oropharynx is clear and moist and mucous membranes are normal.   Eyes: Conjunctivae are normal. Pupils are equal, round, and reactive to light.   Cardiovascular: Normal rate, regular rhythm, normal heart sounds and intact distal pulses.    No murmur heard.  Pulmonary/Chest: Effort normal and breath sounds normal. She has no wheezes. She has no rales.   Musculoskeletal: She exhibits no edema.   Skin: She is not diaphoretic.       Assessment/Plan:   Tatyana is a 52 y.o. female here for follow-up.    Problem List Items Addressed This Visit        Renal/    Idiopathic hematuria - Primary    Current Assessment & Plan     Cystoscopy and CT urogram WNL.  Has mild persistent hematuria despite 3 months of antibiotics.  Will d/w Dr. Daigle.             Other    Chronic insomnia    Current Assessment & Plan     Trial of trazodone.          Relevant Medications    traZODone (DESYREL) 50 MG tablet      Other Visit Diagnoses     Screening, lipid        Relevant Orders    Lipid panel    Screening for diabetes mellitus        Relevant Orders    Comprehensive metabolic panel          Health Maintenance   Topic Date Due    Lipid Panel  1965    TETANUS VACCINE  05/11/1985    Colonoscopy  05/11/2017    Influenza Vaccine  08/01/2018    Mammogram  09/20/2019    Pap Smear with HPV Cotest  09/20/2020     Lipid ordered.  Pt had recent colonoscopy, will obtain records.       Discussion:     Follow-up in about 6 months (around 11/1/2018) for hematuria, insomnia.    Goals     None          Electronically signed by Ruthie Carlos

## 2018-05-01 NOTE — ASSESSMENT & PLAN NOTE
Cystoscopy and CT urogram WNL.  Has mild persistent hematuria despite 3 months of antibiotics.  Will d/w Dr. Daigle.

## 2018-05-10 ENCOUNTER — CLINICAL SUPPORT (OUTPATIENT)
Dept: SMOKING CESSATION | Facility: CLINIC | Age: 53
End: 2018-05-10
Payer: COMMERCIAL

## 2018-05-10 DIAGNOSIS — F17.200 NICOTINE DEPENDENCE: Primary | ICD-10-CM

## 2018-05-10 PROCEDURE — 99406 BEHAV CHNG SMOKING 3-10 MIN: CPT | Mod: S$GLB,,,

## 2018-05-10 PROCEDURE — 99999 PR PBB SHADOW E&M-EST. PATIENT-LVL I: CPT | Mod: PBBFAC,,,

## 2018-05-28 ENCOUNTER — CLINICAL SUPPORT (OUTPATIENT)
Dept: SMOKING CESSATION | Facility: CLINIC | Age: 53
End: 2018-05-28
Payer: COMMERCIAL

## 2018-05-28 DIAGNOSIS — F17.200 NICOTINE DEPENDENCE: Primary | ICD-10-CM

## 2018-05-28 PROCEDURE — 99999 PR PBB SHADOW E&M-EST. PATIENT-LVL I: CPT | Mod: PBBFAC,,,

## 2018-05-28 PROCEDURE — 99406 BEHAV CHNG SMOKING 3-10 MIN: CPT | Mod: S$GLB,,,

## 2018-05-28 RX ORDER — VARENICLINE TARTRATE 1 MG/1
1 TABLET, FILM COATED ORAL DAILY
Qty: 30 TABLET | Refills: 0 | Status: SHIPPED | OUTPATIENT
Start: 2018-05-28 | End: 2018-06-27

## 2018-06-26 LAB
ALBUMIN SERPL-MCNC: 4 G/DL (ref 3.1–4.7)
ALP SERPL-CCNC: 55 IU/L (ref 40–104)
ALT (SGPT): 10 IU/L (ref 3–33)
AST SERPL-CCNC: 26 IU/L (ref 10–40)
BILIRUB SERPL-MCNC: 1.3 MG/DL (ref 0.3–1)
BUN SERPL-MCNC: 14 MG/DL (ref 8–20)
CALCIUM SERPL-MCNC: 8.5 MG/DL (ref 7.7–10.4)
CHLORIDE: 106 MMOL/L (ref 98–110)
CO2 SERPL-SCNC: 24.5 MMOL/L (ref 22.8–31.6)
CREATININE: 0.56 MG/DL (ref 0.6–1.4)
GLUCOSE: 93 MG/DL (ref 70–99)
POTASSIUM SERPL-SCNC: 3.8 MMOL/L (ref 3.5–5)
PROT SERPL-MCNC: 7 G/DL (ref 6–8.2)
SODIUM: 139 MMOL/L (ref 134–144)

## 2018-07-05 ENCOUNTER — CLINICAL SUPPORT (OUTPATIENT)
Dept: SMOKING CESSATION | Facility: CLINIC | Age: 53
End: 2018-07-05
Payer: COMMERCIAL

## 2018-07-05 DIAGNOSIS — F17.200 NICOTINE DEPENDENCE: Primary | ICD-10-CM

## 2018-07-05 PROCEDURE — 99407 BEHAV CHNG SMOKING > 10 MIN: CPT | Mod: S$GLB,,, | Performed by: INTERNAL MEDICINE

## 2018-07-05 NOTE — PROGRESS NOTES
Spoke with patient today in regard to smoking cessation progress for 3 month follow up, she states tobacco free. Commended patient on the accomplishment. Informed patient of benefit period, future follow ups, and contact information if any further help or support is needed. Will complete smart form for 3 month follow up on Quit attempt #1.

## 2018-08-31 DIAGNOSIS — F51.04 CHRONIC INSOMNIA: ICD-10-CM

## 2018-08-31 RX ORDER — ZOLPIDEM TARTRATE 5 MG/1
5 TABLET ORAL NIGHTLY PRN
Qty: 30 TABLET | Refills: 5 | Status: SHIPPED | OUTPATIENT
Start: 2018-08-31 | End: 2019-03-21 | Stop reason: SDUPTHER

## 2018-11-28 ENCOUNTER — TELEPHONE (OUTPATIENT)
Dept: SMOKING CESSATION | Facility: CLINIC | Age: 53
End: 2018-11-28

## 2018-12-04 ENCOUNTER — HOSPITAL ENCOUNTER (OUTPATIENT)
Dept: PREADMISSION TESTING | Facility: AMBULARY SURGERY CENTER | Age: 53
Discharge: HOME | End: 2018-12-04
Attending: OBSTETRICS & GYNECOLOGY
Payer: MEDICAID

## 2018-12-04 VITALS
SYSTOLIC BLOOD PRESSURE: 115 MMHG | HEART RATE: 91 BPM | HEIGHT: 59 IN | DIASTOLIC BLOOD PRESSURE: 72 MMHG | TEMPERATURE: 99 F | BODY MASS INDEX: 21.97 KG/M2 | WEIGHT: 109 LBS | RESPIRATION RATE: 18 BRPM | OXYGEN SATURATION: 98 %

## 2018-12-18 ENCOUNTER — ANESTHESIA EVENT (OUTPATIENT)
Dept: SURGERY | Facility: AMBULARY SURGERY CENTER | Age: 53
End: 2018-12-18
Payer: MEDICAID

## 2018-12-19 ENCOUNTER — ANESTHESIA (OUTPATIENT)
Dept: SURGERY | Facility: AMBULARY SURGERY CENTER | Age: 53
End: 2018-12-19
Payer: MEDICAID

## 2018-12-19 ENCOUNTER — HOSPITAL ENCOUNTER (OUTPATIENT)
Facility: AMBULARY SURGERY CENTER | Age: 53
Discharge: HOME | End: 2018-12-19
Attending: OBSTETRICS & GYNECOLOGY | Admitting: OBSTETRICS & GYNECOLOGY
Payer: MEDICAID

## 2018-12-19 DIAGNOSIS — N94.10 COITUS PAINFUL FOR FEMALE: ICD-10-CM

## 2018-12-19 LAB
B-HCG UR QL: NEGATIVE
CTP QC/QA: YES

## 2018-12-19 PROCEDURE — 49320 DIAG LAPARO SEPARATE PROC: CPT | Performed by: OBSTETRICS & GYNECOLOGY

## 2018-12-19 PROCEDURE — D9220A PRA ANESTHESIA: Mod: CRNA,,, | Performed by: NURSE ANESTHETIST, CERTIFIED REGISTERED

## 2018-12-19 PROCEDURE — D9220A PRA ANESTHESIA: Mod: ANES,,, | Performed by: ANESTHESIOLOGY

## 2018-12-19 RX ORDER — ROCURONIUM BROMIDE 10 MG/ML
INJECTION, SOLUTION INTRAVENOUS
Status: COMPLETED
Start: 2018-12-19 | End: 2018-12-19

## 2018-12-19 RX ORDER — MIDAZOLAM HYDROCHLORIDE 1 MG/ML
INJECTION INTRAMUSCULAR; INTRAVENOUS
Status: COMPLETED
Start: 2018-12-19 | End: 2018-12-19

## 2018-12-19 RX ORDER — FENTANYL CITRATE 50 UG/ML
25 INJECTION, SOLUTION INTRAMUSCULAR; INTRAVENOUS EVERY 5 MIN PRN
Status: COMPLETED | OUTPATIENT
Start: 2018-12-19 | End: 2018-12-19

## 2018-12-19 RX ORDER — NEOSTIGMINE METHYLSULFATE 1 MG/ML
INJECTION, SOLUTION INTRAVENOUS
Status: DISCONTINUED | OUTPATIENT
Start: 2018-12-19 | End: 2018-12-19

## 2018-12-19 RX ORDER — KETOROLAC TROMETHAMINE 30 MG/ML
INJECTION, SOLUTION INTRAMUSCULAR; INTRAVENOUS
Status: DISCONTINUED | OUTPATIENT
Start: 2018-12-19 | End: 2018-12-19

## 2018-12-19 RX ORDER — DEXAMETHASONE SODIUM PHOSPHATE 4 MG/ML
INJECTION, SOLUTION INTRA-ARTICULAR; INTRALESIONAL; INTRAMUSCULAR; INTRAVENOUS; SOFT TISSUE
Status: DISCONTINUED | OUTPATIENT
Start: 2018-12-19 | End: 2018-12-19

## 2018-12-19 RX ORDER — OXYCODONE AND ACETAMINOPHEN 5; 325 MG/1; MG/1
1 TABLET ORAL EVERY 4 HOURS PRN
Qty: 15 TABLET | Refills: 0 | Status: SHIPPED | OUTPATIENT
Start: 2018-12-19 | End: 2019-02-18

## 2018-12-19 RX ORDER — PROPOFOL 10 MG/ML
INJECTION, EMULSION INTRAVENOUS
Status: COMPLETED
Start: 2018-12-19 | End: 2018-12-19

## 2018-12-19 RX ORDER — LIDOCAINE HYDROCHLORIDE 10 MG/ML
1 INJECTION, SOLUTION EPIDURAL; INFILTRATION; INTRACAUDAL; PERINEURAL ONCE
Status: DISCONTINUED | OUTPATIENT
Start: 2018-12-19 | End: 2018-12-19 | Stop reason: HOSPADM

## 2018-12-19 RX ORDER — MIDAZOLAM HYDROCHLORIDE 1 MG/ML
INJECTION, SOLUTION INTRAMUSCULAR; INTRAVENOUS
Status: DISCONTINUED | OUTPATIENT
Start: 2018-12-19 | End: 2018-12-19

## 2018-12-19 RX ORDER — KETOROLAC TROMETHAMINE 30 MG/ML
INJECTION, SOLUTION INTRAMUSCULAR; INTRAVENOUS
Status: COMPLETED
Start: 2018-12-19 | End: 2018-12-19

## 2018-12-19 RX ORDER — SODIUM CHLORIDE 0.9 % (FLUSH) 0.9 %
3 SYRINGE (ML) INJECTION
Status: DISCONTINUED | OUTPATIENT
Start: 2018-12-19 | End: 2018-12-19 | Stop reason: HOSPADM

## 2018-12-19 RX ORDER — GLYCOPYRROLATE 0.2 MG/ML
INJECTION INTRAMUSCULAR; INTRAVENOUS
Status: COMPLETED
Start: 2018-12-19 | End: 2018-12-19

## 2018-12-19 RX ORDER — DEXAMETHASONE SODIUM PHOSPHATE 4 MG/ML
INJECTION, SOLUTION INTRA-ARTICULAR; INTRALESIONAL; INTRAMUSCULAR; INTRAVENOUS; SOFT TISSUE
Status: COMPLETED
Start: 2018-12-19 | End: 2018-12-19

## 2018-12-19 RX ORDER — IBUPROFEN 800 MG/1
800 TABLET ORAL EVERY 6 HOURS PRN
Qty: 30 TABLET | Refills: 0 | Status: SHIPPED | OUTPATIENT
Start: 2018-12-19 | End: 2018-12-29

## 2018-12-19 RX ORDER — FENTANYL CITRATE 50 UG/ML
INJECTION, SOLUTION INTRAMUSCULAR; INTRAVENOUS
Status: COMPLETED
Start: 2018-12-19 | End: 2018-12-19

## 2018-12-19 RX ORDER — LIDOCAINE HCL/PF 100 MG/5ML
SYRINGE (ML) INTRAVENOUS
Status: DISCONTINUED | OUTPATIENT
Start: 2018-12-19 | End: 2018-12-19

## 2018-12-19 RX ORDER — PROPOFOL 10 MG/ML
VIAL (ML) INTRAVENOUS
Status: DISCONTINUED | OUTPATIENT
Start: 2018-12-19 | End: 2018-12-19

## 2018-12-19 RX ORDER — NEOSTIGMINE METHYLSULFATE 1 MG/ML
INJECTION, SOLUTION INTRAVENOUS
Status: COMPLETED
Start: 2018-12-19 | End: 2018-12-19

## 2018-12-19 RX ORDER — ACETAMINOPHEN 10 MG/ML
INJECTION, SOLUTION INTRAVENOUS
Status: DISCONTINUED
Start: 2018-12-19 | End: 2018-12-19 | Stop reason: WASHOUT

## 2018-12-19 RX ORDER — ROCURONIUM BROMIDE 10 MG/ML
INJECTION, SOLUTION INTRAVENOUS
Status: DISCONTINUED | OUTPATIENT
Start: 2018-12-19 | End: 2018-12-19

## 2018-12-19 RX ORDER — OXYCODONE AND ACETAMINOPHEN 5; 325 MG/1; MG/1
1 TABLET ORAL
Status: DISCONTINUED | OUTPATIENT
Start: 2018-12-19 | End: 2018-12-19 | Stop reason: HOSPADM

## 2018-12-19 RX ORDER — FENTANYL CITRATE 50 UG/ML
INJECTION, SOLUTION INTRAMUSCULAR; INTRAVENOUS
Status: DISCONTINUED | OUTPATIENT
Start: 2018-12-19 | End: 2018-12-19

## 2018-12-19 RX ORDER — METOCLOPRAMIDE HYDROCHLORIDE 5 MG/ML
10 INJECTION INTRAMUSCULAR; INTRAVENOUS EVERY 10 MIN PRN
Status: DISCONTINUED | OUTPATIENT
Start: 2018-12-19 | End: 2018-12-19 | Stop reason: HOSPADM

## 2018-12-19 RX ORDER — GLYCOPYRROLATE 0.2 MG/ML
INJECTION INTRAMUSCULAR; INTRAVENOUS
Status: DISCONTINUED | OUTPATIENT
Start: 2018-12-19 | End: 2018-12-19

## 2018-12-19 RX ORDER — SODIUM CHLORIDE, SODIUM LACTATE, POTASSIUM CHLORIDE, CALCIUM CHLORIDE 600; 310; 30; 20 MG/100ML; MG/100ML; MG/100ML; MG/100ML
INJECTION, SOLUTION INTRAVENOUS CONTINUOUS
Status: DISCONTINUED | OUTPATIENT
Start: 2018-12-19 | End: 2018-12-19 | Stop reason: HOSPADM

## 2018-12-19 RX ORDER — LIDOCAINE HYDROCHLORIDE 20 MG/ML
INJECTION, SOLUTION EPIDURAL; INFILTRATION; INTRACAUDAL; PERINEURAL
Status: DISCONTINUED
Start: 2018-12-19 | End: 2018-12-19 | Stop reason: HOSPADM

## 2018-12-19 RX ORDER — PROMETHAZINE HYDROCHLORIDE 25 MG/ML
INJECTION, SOLUTION INTRAMUSCULAR; INTRAVENOUS
Status: DISCONTINUED
Start: 2018-12-19 | End: 2018-12-19 | Stop reason: HOSPADM

## 2018-12-19 RX ORDER — ONDANSETRON 2 MG/ML
INJECTION INTRAMUSCULAR; INTRAVENOUS
Status: DISCONTINUED | OUTPATIENT
Start: 2018-12-19 | End: 2018-12-19

## 2018-12-19 RX ADMIN — FENTANYL CITRATE 25 MCG: 50 INJECTION, SOLUTION INTRAMUSCULAR; INTRAVENOUS at 08:12

## 2018-12-19 RX ADMIN — ROCURONIUM BROMIDE 25 MG: 10 INJECTION, SOLUTION INTRAVENOUS at 07:12

## 2018-12-19 RX ADMIN — DEXAMETHASONE SODIUM PHOSPHATE 8 MG: 4 INJECTION, SOLUTION INTRA-ARTICULAR; INTRALESIONAL; INTRAMUSCULAR; INTRAVENOUS; SOFT TISSUE at 07:12

## 2018-12-19 RX ADMIN — OXYCODONE AND ACETAMINOPHEN 1 TABLET: 5; 325 TABLET ORAL at 09:12

## 2018-12-19 RX ADMIN — MIDAZOLAM HYDROCHLORIDE 2 MG: 1 INJECTION, SOLUTION INTRAMUSCULAR; INTRAVENOUS at 07:12

## 2018-12-19 RX ADMIN — NEOSTIGMINE METHYLSULFATE 4 MG: 1 INJECTION, SOLUTION INTRAVENOUS at 08:12

## 2018-12-19 RX ADMIN — Medication 190 MG: at 07:12

## 2018-12-19 RX ADMIN — SODIUM CHLORIDE, SODIUM LACTATE, POTASSIUM CHLORIDE, CALCIUM CHLORIDE: 600; 310; 30; 20 INJECTION, SOLUTION INTRAVENOUS at 07:12

## 2018-12-19 RX ADMIN — FENTANYL CITRATE 50 MCG: 50 INJECTION, SOLUTION INTRAMUSCULAR; INTRAVENOUS at 07:12

## 2018-12-19 RX ADMIN — KETOROLAC TROMETHAMINE 30 MG: 30 INJECTION, SOLUTION INTRAMUSCULAR; INTRAVENOUS at 08:12

## 2018-12-19 RX ADMIN — Medication 75 MG: at 07:12

## 2018-12-19 RX ADMIN — GLYCOPYRROLATE 0.2 MG: 0.2 INJECTION INTRAMUSCULAR; INTRAVENOUS at 07:12

## 2018-12-19 RX ADMIN — GLYCOPYRROLATE 0.2 MG: 0.2 INJECTION INTRAMUSCULAR; INTRAVENOUS at 08:12

## 2018-12-19 RX ADMIN — ONDANSETRON 4 MG: 2 INJECTION INTRAMUSCULAR; INTRAVENOUS at 07:12

## 2018-12-19 NOTE — DISCHARGE SUMMARY
[facility]  Discharge Note  Short Stay    Admit Date: 12/19/2018    Discharge Date and Time: 12/19/2018    Attending Physician: Christy Garcia MD     Discharge Provider: Christy Garcia    Diagnoses:  Active Hospital Problems    Diagnosis  POA    Coitus painful for female [N94.10]  Yes      Resolved Hospital Problems   No resolved problems to display.       Discharged Condition: good    Hospital Course: Patient was admitted for an outpatient procedure and tolerated the procedure well with no complications.    Final Diagnoses: Same as principal problem.    Disposition: Home or Self Care    Follow up/Patient Instructions:    Medications:  Reconciled Home Medications:      Medication List      START taking these medications    ibuprofen 800 MG tablet  Commonly known as:  ADVIL,MOTRIN  Take 1 tablet (800 mg total) by mouth every 6 (six) hours as needed for Pain.     oxyCODONE-acetaminophen 5-325 mg per tablet  Commonly known as:  PERCOCET  Take 1 tablet by mouth every 4 (four) hours as needed for Pain.        CONTINUE taking these medications    NEETA 0.35 mg tablet  Generic drug:  norethindrone  Neeta 0.35 mg tablet     traZODone 50 MG tablet  Commonly known as:  DESYREL  Take 1 tablet (50 mg total) by mouth every evening.     zolpidem 5 MG Tab  Commonly known as:  AMBIEN  Take 1 tablet (5 mg total) by mouth nightly as needed.          Discharge Procedure Orders   Diet Adult Regular     Notify your health care provider if you experience any of the following:  temperature >100.4     Notify your health care provider if you experience any of the following:  persistent nausea and vomiting or diarrhea     Notify your health care provider if you experience any of the following:  severe uncontrolled pain     Notify your health care provider if you experience any of the following:  redness, tenderness, or signs of infection (pain, swelling, redness, odor or green/yellow discharge around incision site)      Notify your health care provider if you experience any of the following:  difficulty breathing or increased cough     Notify your health care provider if you experience any of the following:  severe persistent headache     Notify your health care provider if you experience any of the following:  worsening rash     Notify your health care provider if you experience any of the following:  persistent dizziness, light-headedness, or visual disturbances     Notify your health care provider if you experience any of the following:  increased confusion or weakness     Notify your health care provider if you experience any of the following:     Activity as tolerated     Follow-up Information     Christy Garcia MD In 3 weeks.    Specialty:  Obstetrics and Gynecology  Contact information:  [address] [facility]  [id number]  [facility] [address]  [phone]                   Discharge Procedure Orders (must include Diet, Follow-up, Activity):   Discharge Procedure Orders (must include Diet, Follow-up, Activity)   Diet Adult Regular     Notify your health care provider if you experience any of the following:  temperature >100.4     Notify your health care provider if you experience any of the following:  persistent nausea and vomiting or diarrhea     Notify your health care provider if you experience any of the following:  severe uncontrolled pain     Notify your health care provider if you experience any of the following:  redness, tenderness, or signs of infection (pain, swelling, redness, odor or green/yellow discharge around incision site)     Notify your health care provider if you experience any of the following:  difficulty breathing or increased cough     Notify your health care provider if you experience any of the following:  severe persistent headache     Notify your health care provider if you experience any of the following:  worsening rash     Notify your health care provider if you experience any of the following:   persistent dizziness, light-headedness, or visual disturbances     Notify your health care provider if you experience any of the following:  increased confusion or weakness     Notify your health care provider if you experience any of the following:     Activity as tolerated

## 2018-12-19 NOTE — PLAN OF CARE
Stable, states ready to go home, olivia po fluids, pain tolerable, voided, ambulated to car with RN to

## 2018-12-19 NOTE — OP NOTE
"OPERATIVE REPORT    PREOPERATIVE DIAGNOSIS:  Dyspareunia    POSTOPERATIVE DIAGNOSIS:  Same    PROCEDURE PERFORMED:  Diagnostic Laparoscopy  12/19/2018     SURGEON:  Christy Garcia MD    ANESTHESIA:  General    FINDINGS:  Normal appearing uterus, bilateral ovaries & tubes. Normal pelvic cavity.  Thick adhesion from omentum to ant abdominal wall,  Foamy appearance of omentum    PROCEDURE IN DETAIL:  The patient was taken to the Operating Room after the appropriate consents had been obtained.  She was placed on the Operating Room table where she underwent general endotracheal anesthesia without event.  She was then placed in the dorsal lithotomy position.  Sterile prep and drape of the abdomen was then performed.  The bladder was emptied using a red-rubber catheter.  A single-tooth tenaculum was applied to the anterior lip of the cervix and an acorn manipulator was introduced and secured in place.  A small infraumbilical skin incision was then made.  The incision was held up on either side and the Veres needle was introduced without difficulty.  The abdomen was insufflated with 2 liters of carbon dioxide gas and the Veres needle was then removed.  The trocar was introduced without difficulty and the laparoscope was inserted and the abdomen contents were immediately visualized. The entire pelvic cavity was explored. The uterus was midline.  Both tubes and ovaries were within normal limits.  The pelvic cul de sac appeared normal with no evidence of endometriosis.  The posterior bladder appear normal.  The liver edge was smooth & appeared normal.  There was noted to be a thick adhesion from the anterior abdominal wall to the omentum.  There was also noted to be a "foamy" appearance to a small portion of the omentum.  The rest of the pelvis was inspected and found to be free of pathology.  The laparoscope was removed under direct visualization.  No bleeding was noted about the laparoscopy site.  The gas was expelled and " the trocar was removed and the incision was reapproximated using 4-0 undyed monocryl subcuticular.  Dressings were applied.  The vaginal manipulators were removed.  The patient was extubated and taken to Recovery in stable condition.    COMPLICATIONS:  none

## 2018-12-19 NOTE — DISCHARGE INSTRUCTIONS
Anesthesia information    Anesthesia Safety      You have been given medicine  to sedate you during your procedure today. This may have included both a pain medicine and sleeping medicine. Most of the effects have worn off; however, you may continue to have some drowsiness for the next  24 hours. Anesthesia and pain medicines can cause nausea, sleepiness, dizziness and  constipation.    HOME CARE:  1) For the next EIGHT HOURS, you should be watched by a responsible adult to look for any worsening of your condition.  2) DO NOT DRINK any ALCOHOL for the next 24 HOURS.  3) DO NOT DRIVE or operate dangerous machinery during the next 24 HOURS.  FOLLOW UP with your doctor or this facility if you are not alert and back to your usual level of activity within 24 hrs.  GET PROMPT MEDICAL ATTENTION if any of the following occur:  -- Increased drowsiness  -- Increased weakness or dizziness  -- Repeated vomiting  -- If you cannot be awakened    Discharge Instructions for Laparoscopic Surgery  You Home Care  · Take it easy. Stay quiet and rest for 2 days.  · Return to your normal activities after 48 hours. You may also return to work at that time.  · Eat a normal diet.  · If needed, take an over-the-counter pain reliever for pain or the medicine prescribed   · RX Percocet- given at 915,  RX for Ibuprofen 800 mg given  · Don't lift anything heavier than 10 pounds for 1 week after the procedure.  · Don't drive for 24 hours after the procedure  · Nothing in the vagina until seen by MD  · Remove dressing in 2 days, may get incision wet. Pat dry, dont rub.  · You may want to put a bandaid over your incision to prevent irritation from clothes  Follow-Up  Make a follow-up appointment as directed by our staff.  When to Call Your Doctor  Call your doctor right away if you have any of the following:  · Fever above 101.5°F or chills  · Dizziness or fainting  · Abdominal pain and swelling that become worse  · Nausea and vomiting  · Signs of  infection, such as drainage, pus, warmth, or redness at your incision site   © 9700-4088 [facility], [address] [place] [address], [place] [address]. All rights reserved. This information is not intended as a substitute for professional medical care. Always follow your healthcare professional's instructionDischarge Instructions for Laparoscopic Fallopian Tube Ligation (Laparoscopic Surgical Sterilization)  Your doctor performed a sterilization procedure to prevent any future pregnancies. This is a permanent form of birth control. Several different methods of surgical sterilization can be used to block the fallopian tubes. Then the egg cannot enter the uterus (womb) and sperm cannot travel up to fertilize the egg. Here's what you can to to speed your recovery following surgery.

## 2018-12-19 NOTE — ANESTHESIA PREPROCEDURE EVALUATION
12/19/2018  Tatyana Bello is a 53 y.o., female.    Anesthesia Evaluation    I have reviewed the Patient Summary Reports.    I have reviewed the Nursing Notes.   I have reviewed the Medications.     Review of Systems  Anesthesia Hx:  Denies Family Hx of Anesthesia complications.   Denies Personal Hx of Anesthesia complications.   Social:  Non-Smoker    Renal/:   renal calculi        Physical Exam  General:  Well nourished    Airway/Jaw/Neck:  Airway Findings: Mouth Opening: Normal Tongue: Normal  General Airway Assessment: Adult  Mallampati: III  Improves to II with phonation.  TM Distance: Normal, at least 6 cm         Dental:  DENTAL FINDINGS: Normal   Chest/Lungs:  Chest/Lungs Clear    Heart/Vascular:  Heart Findings: Normal            Anesthesia Plan  Type of Anesthesia, risks & benefits discussed:  Anesthesia Type:  general  Patient's Preference:   Intra-op Monitoring Plan: standard ASA monitors  Intra-op Monitoring Plan Comments:   Post Op Pain Control Plan:   Post Op Pain Control Plan Comments:   Induction:   IV  Beta Blocker:  Patient is not currently on a Beta-Blocker (No further documentation required).       Informed Consent: Patient understands risks and agrees with Anesthesia plan.  Questions answered. Anesthesia consent signed with patient.  ASA Score: 1     Day of Surgery Review of History & Physical:    H&P update referred to the surgeon.         Ready For Surgery From Anesthesia Perspective.

## 2018-12-19 NOTE — TRANSFER OF CARE
Anesthesia Transfer of Care Note    Patient: Tatyana Bello    Procedure(s) Performed: Procedure(s) (LRB):  LAPAROSCOPY, DIAGNOSTIC (N/A)    Patient location: PACU    Anesthesia Type: general    Transport from OR: Transported from OR on room air with adequate spontaneous ventilation    Post pain: adequate analgesia    Post assessment: no apparent anesthetic complications and tolerated procedure well    Post vital signs: stable    Level of consciousness: sedated    Nausea/Vomiting: no nausea/vomiting    Complications: none    Transfer of care protocol was followed      Last vitals:   Visit Vitals  /81   Pulse 80   Temp 36.6 °C (97.9 °F) (Skin)   Resp 12   Ht 5' (1.524 m)   Wt 47.6 kg (105 lb)   SpO2 99%   Breastfeeding? No   BMI 20.51 kg/m²

## 2018-12-26 VITALS
TEMPERATURE: 98 F | RESPIRATION RATE: 20 BRPM | DIASTOLIC BLOOD PRESSURE: 78 MMHG | SYSTOLIC BLOOD PRESSURE: 139 MMHG | HEART RATE: 72 BPM | OXYGEN SATURATION: 98 % | HEIGHT: 60 IN | BODY MASS INDEX: 20.62 KG/M2 | WEIGHT: 105 LBS

## 2019-02-18 ENCOUNTER — OFFICE VISIT (OUTPATIENT)
Dept: FAMILY MEDICINE | Facility: CLINIC | Age: 54
End: 2019-02-18
Payer: MEDICAID

## 2019-02-18 VITALS
RESPIRATION RATE: 17 BRPM | WEIGHT: 110.06 LBS | SYSTOLIC BLOOD PRESSURE: 100 MMHG | DIASTOLIC BLOOD PRESSURE: 78 MMHG | HEIGHT: 60 IN | HEART RATE: 86 BPM | OXYGEN SATURATION: 97 % | TEMPERATURE: 98 F | BODY MASS INDEX: 21.61 KG/M2

## 2019-02-18 DIAGNOSIS — D48.4 NEOPLASM OF UNCERTAIN BEHAVIOR OF OMENTUM: ICD-10-CM

## 2019-02-18 DIAGNOSIS — K66.8 MASS OF OMENTUM: ICD-10-CM

## 2019-02-18 DIAGNOSIS — K66.0 ADHESION OF OMENTUM: Primary | ICD-10-CM

## 2019-02-18 PROCEDURE — 99214 OFFICE O/P EST MOD 30 MIN: CPT | Mod: ,,, | Performed by: INTERNAL MEDICINE

## 2019-02-18 PROCEDURE — 99214 PR OFFICE/OUTPT VISIT, EST, LEVL IV, 30-39 MIN: ICD-10-PCS | Mod: ,,, | Performed by: INTERNAL MEDICINE

## 2019-02-18 NOTE — PROGRESS NOTES
"                                    ADULT FOLLOW-UP VISIT    Subjective:     Chief Complaint:  Follow-up (per OBGYN for omentum had foamy appearance)      54 yo woman here with concern for findings by Dr. Anderson (OBGYN) on recent ex-lap. Pt was seeing Dr. Anderson for heavy, painful periods and pain with intercourse.  Per pt no imaging was done in the w/u of this problem. She had an ex-lap in December 2018 which showed normal uterus, ovaries, cervix but did note "Thick adhesion from omentum to ant abdominal wall,  Foamy appearance of omentum."  No pathologic specimen sent. Pt instructed to f/u with PCP for possible referral to general surgery for this problem. Pt denies any current abdominal or GI symptoms. Her GYN issues have also resolved on OCP. She denies vaginal bleeding, pelvic pain, vaginal discharge, dysuria, difficulty urinating.           Ms. Bello  has a past medical history of Insomnia and Wears contact lenses.    Family history and social history are reviewed and there are no significant changes.     ROS:  Review of Systems   Constitutional: Negative for activity change, appetite change, chills, fatigue, fever and unexpected weight change.   Gastrointestinal: Negative for abdominal distention, abdominal pain, blood in stool, constipation, diarrhea, nausea and vomiting.   Genitourinary: Negative for difficulty urinating, dyspareunia, dysuria, flank pain, frequency, hematuria, pelvic pain, urgency, vaginal bleeding, vaginal discharge and vaginal pain.          Current Outpatient Medications:     norethindrone (NEETA) 0.35 mg tablet, Neeta 0.35 mg tablet, Disp: , Rfl:     zolpidem (AMBIEN) 5 MG Tab, Take 1 tablet (5 mg total) by mouth nightly as needed., Disp: 30 tablet, Rfl: 5      Objective:     Physical Examination:     /78   Pulse 86   Temp 97.7 °F (36.5 °C)   Resp 17   Ht 5' (1.524 m)   Wt 49.9 kg (110 lb 1 oz)   SpO2 97%   BMI 21.50 kg/m²     Physical Exam   Constitutional: She appears " well-developed and well-nourished. No distress.   HENT:   Head: Normocephalic and atraumatic.   Nose: Nose normal.   Mouth/Throat: Oropharynx is clear and moist and mucous membranes are normal.   Eyes: Conjunctivae are normal. Pupils are equal, round, and reactive to light.   Cardiovascular: Normal rate, regular rhythm, normal heart sounds and intact distal pulses.   No murmur heard.  Pulmonary/Chest: Effort normal and breath sounds normal. She has no wheezes. She has no rales.   Abdominal: Normal appearance and bowel sounds are normal. She exhibits no distension and no ascites. There is no tenderness.       Musculoskeletal: She exhibits no edema.   Skin: She is not diaphoretic.       Assessment/Plan:   Tatyana is a 53 y.o. female here for follow-up.    Problem List Items Addressed This Visit        GI    Adhesion of omentum - Primary    Current Assessment & Plan     Referred to general surgery for further evaluation.          Relevant Orders    Ambulatory referral to General Surgery          Health Maintenance   Topic Date Due    Colonoscopy  05/11/2017    Influenza Vaccine  04/08/2019 (Originally 8/1/2018)    TETANUS VACCINE  02/18/2020 (Originally 5/11/1985)    Mammogram  09/20/2019    Pap Smear with HPV Cotest  09/20/2020    Lipid Panel  06/26/2023           Discussion:     No Follow-up on file.    Goals     None          Electronically signed by Ruthie Carlos

## 2019-02-18 NOTE — Clinical Note
Referring this pt for findings on GYN surgery (see note).  Let me know if you think imaging would be helpful prior to visit.

## 2019-02-18 NOTE — Clinical Note
Case discussed with Dr. Duff. CT ordered for pt to have prior to visit. Can you help this patient get scheduled?Thanks

## 2019-02-26 NOTE — PROGRESS NOTES
Case discussed with Dr. Duff. Recommended CT a/p with contrast for evaluation prior to office visit.  CT ordered and pt informed.

## 2019-03-14 ENCOUNTER — OFFICE VISIT (OUTPATIENT)
Dept: SURGERY | Facility: CLINIC | Age: 54
End: 2019-03-14
Payer: MEDICAID

## 2019-03-14 ENCOUNTER — TELEPHONE (OUTPATIENT)
Dept: PEDIATRICS | Facility: CLINIC | Age: 54
End: 2019-03-14

## 2019-03-14 VITALS
BODY MASS INDEX: 21.6 KG/M2 | WEIGHT: 110 LBS | SYSTOLIC BLOOD PRESSURE: 122 MMHG | DIASTOLIC BLOOD PRESSURE: 87 MMHG | TEMPERATURE: 98 F | HEIGHT: 60 IN

## 2019-03-14 DIAGNOSIS — R31.29 MICROSCOPIC HEMATURIA: Primary | ICD-10-CM

## 2019-03-14 DIAGNOSIS — Z13.220 SCREENING, LIPID: ICD-10-CM

## 2019-03-14 DIAGNOSIS — K66.0 ADHESION OF OMENTUM: Primary | ICD-10-CM

## 2019-03-14 DIAGNOSIS — R14.0 ABDOMINAL DISTENSION: ICD-10-CM

## 2019-03-14 DIAGNOSIS — Z13.1 SCREENING FOR DIABETES MELLITUS: ICD-10-CM

## 2019-03-14 PROCEDURE — 99203 PR OFFICE/OUTPT VISIT, NEW, LEVL III, 30-44 MIN: ICD-10-PCS | Mod: ,,, | Performed by: SURGERY

## 2019-03-14 PROCEDURE — 99203 OFFICE O/P NEW LOW 30 MIN: CPT | Mod: ,,, | Performed by: SURGERY

## 2019-03-14 RX ORDER — DIAZEPAM 10 MG/1
10 TABLET ORAL
COMMUNITY
Start: 2019-02-22 | End: 2020-09-28

## 2019-03-14 RX ORDER — TRIAZOLAM 0.25 MG/1
TABLET ORAL
COMMUNITY
Start: 2019-02-22 | End: 2020-09-28

## 2019-03-14 NOTE — PROGRESS NOTES
"Subjective:       Patient ID: Tatyana Bello is a 53 y.o. female.    Chief Complaint: Other (Referred by  to eval adhesions of omentum)      HPI:  Patient is referred to the office die to findings of an omental adhesion to the abdominal wall with the appearance of a "foamy" segment in Dec 2018.  This was seen during a diagnostic laparoscopy to rule out endometriosis. She had CT abd and pelvis last week that was negative for identifiable pathology.  She had colonoscopy two years ago where she was found to have benign polyps. She has no abdominal pain, nausea, or vomiting.  She reports no diarrhea or constipation.  She reports no constitutional symptoms.  She has no personal history of any type of cancer.  She has family history of colon cancer in her brother and breast cancer in a sister.  Her last MMG was this year and was BIRADS 1.  She has a chest xray in Sept 2018 that was clear.          Past Medical History:   Diagnosis Date    Insomnia     Wears contact lenses      Past Surgical History:   Procedure Laterality Date    BREAST BIOPSY Right     CARDIAC ELECTROPHYSIOLOGY STUDY AND ABLATION      CYSTOSCOPY N/A 1/2/2018    [facility]    LAPAROSCOPY, DIAGNOSTIC N/A 12/19/2018    [facility]     Review of patient's allergies indicates:  No Known Allergies  Medication List with Changes/Refills   Current Medications    DIAZEPAM (VALIUM) 10 MG TAB        NORETHINDRONE (VANESSA) 0.35 MG TABLET    Vanessa 0.35 mg tablet    TRIAZOLAM (HALCION) 0.25 MG TAB        ZOLPIDEM (AMBIEN) 5 MG TAB    Take 1 tablet (5 mg total) by mouth nightly as needed.     Family History   Problem Relation Age of Onset    Stroke Father     Breast cancer Sister     Bone cancer Sister     Colon cancer Brother     Diabetes Mother     Hypertension Mother     Stroke Maternal Grandmother     Stroke Maternal Grandfather      Social History     Socioeconomic " History    Marital status:      Spouse name: None    Number of children: 1    Years of education: None    Highest education level: None   Social Needs    Financial resource strain: None    Food insecurity - worry: None    Food insecurity - inability: None    Transportation needs - medical: None    Transportation needs - non-medical: None   Occupational History    None   Tobacco Use    Smoking status: Former Smoker    Smokeless tobacco: Never Used   Substance and Sexual Activity    Alcohol use: Yes    Drug use: No    Sexual activity: Yes     Partners: Male   Other Topics Concern    None   Social History Narrative    None         Review of Systems   Constitutional: Negative for appetite change, chills, fever and unexpected weight change.   HENT: Negative for hearing loss, rhinorrhea, sore throat and voice change.    Eyes: Negative for photophobia and visual disturbance.   Respiratory: Negative for cough, choking and shortness of breath.    Cardiovascular: Negative for chest pain, palpitations and leg swelling.   Gastrointestinal: Negative for abdominal pain, blood in stool, constipation, diarrhea, nausea and vomiting.   Endocrine: Negative for cold intolerance, heat intolerance, polydipsia and polyuria.   Musculoskeletal: Negative for arthralgias, back pain, joint swelling and neck stiffness.   Skin: Negative for color change, pallor and rash.   Neurological: Negative for dizziness, seizures, syncope and headaches.   Hematological: Negative for adenopathy. Does not bruise/bleed easily.   Psychiatric/Behavioral: Negative for agitation, behavioral problems and confusion.       Objective:      Physical Exam   Constitutional: She is oriented to person, place, and time. She appears well-developed and well-nourished.  Non-toxic appearance. No distress.   HENT:   Head: Normocephalic and atraumatic. Head is without abrasion and without laceration.   Right Ear: External ear normal.   Left Ear: External  ear normal.   Nose: Nose normal.   Mouth/Throat: Oropharynx is clear and moist.   Eyes: EOM are normal. Pupils are equal, round, and reactive to light.   Neck: Trachea normal. Neck supple. No tracheal deviation and normal range of motion present.   Cardiovascular: Normal rate and regular rhythm.   Pulmonary/Chest: Effort normal. No accessory muscle usage. No tachypnea. No respiratory distress.   Abdominal: Soft. Normal appearance and bowel sounds are normal. She exhibits no distension and no mass. There is no hepatosplenomegaly. There is no tenderness. There is no rigidity, no rebound and no guarding. No hernia.   Lymphadenopathy:     She has no cervical adenopathy.        Right: No inguinal adenopathy present.        Left: No inguinal adenopathy present.   Neurological: She is alert and oriented to person, place, and time. Coordination and gait normal.   Skin: Skin is warm and intact.   Psychiatric: She has a normal mood and affect. Her speech is normal and behavior is normal.       Assessment/Plan:   Tatyana was seen today for other.    Diagnoses and all orders for this visit:    Adhesion of omentum    She had diagnostic laparoscopy to rule out endometriosis.  She was found to have an omental adhesion to the abdominal wall and the appearance of the omentum appeared foamy in a small segment.  CT abd and pelvis is negative for identifiable pathology.  She had colonoscopy two years ago where she was found to have benign polyps. She has no abdominal pain or physical exam findings.  I have low suspicion for a malignant process as of now.  Will order repeat CT in three months to reevaluate.  Will consider diagnostic laparoscopy at that time. That will be six months from the previous surgery.

## 2019-03-14 NOTE — LETTER
March 15, 2019      Ruthie Carlos MD  [address]  [place] [address]           [facility]  [address]  [place] [address]  Phone: [phone]  Fax: [phone]          Patient: Tatyana Bello   MR Number: [id number]   YOB: 1965   Date of Visit: 3/14/2019       Dear Dr. Ruthie Carlos:    Thank you for referring Tatyana Bello to me for evaluation. Attached you will find relevant portions of my assessment and plan of care.    If you have questions, please do not hesitate to call me. I look forward to following Tatyana Bello along with you.    Sincerely,    Bigg Duff III, MD    Enclosure  CC:  No Recipients    If you would like to receive this communication electronically, please contact [email or web address] or [phone] to request more information on [email or web address] Link access.    For providers and/or their staff who would like to refer a patient to [facility], please contact us through our one-stop-shop provider referral line, [facility], at [phone].    If you feel you have received this communication in error or would no longer like to receive these types of communications, please e-mail [email or web address]

## 2019-03-15 NOTE — TELEPHONE ENCOUNTER
D/w patient recent visit to Dr. Duff. Plan is to repeat CT in 3 months and proceed from there. Pt requested to do regular screening labs around that time as well.

## 2019-03-21 DIAGNOSIS — F51.04 CHRONIC INSOMNIA: ICD-10-CM

## 2019-03-21 RX ORDER — ZOLPIDEM TARTRATE 5 MG/1
TABLET ORAL
Qty: 30 TABLET | Refills: 5 | Status: SHIPPED | OUTPATIENT
Start: 2019-03-21 | End: 2019-10-14 | Stop reason: SDUPTHER

## 2019-04-08 ENCOUNTER — TELEPHONE (OUTPATIENT)
Dept: SMOKING CESSATION | Facility: CLINIC | Age: 54
End: 2019-04-08

## 2019-05-08 ENCOUNTER — CLINICAL SUPPORT (OUTPATIENT)
Dept: SMOKING CESSATION | Facility: CLINIC | Age: 54
End: 2019-05-08
Payer: COMMERCIAL

## 2019-05-08 DIAGNOSIS — F17.200 NICOTINE DEPENDENCE: Primary | ICD-10-CM

## 2019-05-08 PROCEDURE — 99407 PR TOBACCO USE CESSATION INTENSIVE >10 MINUTES: ICD-10-PCS | Mod: S$GLB,,,

## 2019-05-08 PROCEDURE — 99407 BEHAV CHNG SMOKING > 10 MIN: CPT | Mod: S$GLB,,,

## 2019-05-08 NOTE — PROGRESS NOTES
Spoke with patient today in regard to smoking cessation progress for 12 month follow up, she is currently tobacco free. Congratulated patient on the quit episode. Informed patient of benefit period,and contact information if any further help or support is needed.  Completed / resolved  smart form for 12 month follow up on Quit .

## 2019-10-14 DIAGNOSIS — F51.04 CHRONIC INSOMNIA: ICD-10-CM

## 2019-10-15 RX ORDER — ZOLPIDEM TARTRATE 5 MG/1
TABLET ORAL
Qty: 30 TABLET | Refills: 5 | Status: SHIPPED | OUTPATIENT
Start: 2019-10-15 | End: 2020-04-14

## 2019-11-27 DIAGNOSIS — Z12.31 VISIT FOR SCREENING MAMMOGRAM: Primary | ICD-10-CM

## 2019-12-24 ENCOUNTER — HOSPITAL ENCOUNTER (OUTPATIENT)
Dept: RADIOLOGY | Facility: HOSPITAL | Age: 54
Discharge: HOME | End: 2019-12-24
Attending: OBSTETRICS & GYNECOLOGY
Payer: MEDICAID

## 2019-12-24 VITALS — HEIGHT: 60 IN | WEIGHT: 110 LBS | BODY MASS INDEX: 21.6 KG/M2

## 2019-12-24 DIAGNOSIS — Z12.31 VISIT FOR SCREENING MAMMOGRAM: ICD-10-CM

## 2019-12-24 PROCEDURE — 77067 SCR MAMMO BI INCL CAD: CPT | Mod: TC,PO

## 2019-12-26 ENCOUNTER — TELEPHONE (OUTPATIENT)
Dept: PEDIATRICS | Facility: CLINIC | Age: 54
End: 2019-12-26

## 2019-12-26 NOTE — TELEPHONE ENCOUNTER
----- Message from Ruthie Carlos MD sent at 12/26/2019  9:17 AM CST -----  Please call patient with normal results.

## 2020-04-14 DIAGNOSIS — F51.04 CHRONIC INSOMNIA: ICD-10-CM

## 2020-04-14 RX ORDER — ZOLPIDEM TARTRATE 5 MG/1
TABLET ORAL
Qty: 30 TABLET | Refills: 5 | Status: SHIPPED | OUTPATIENT
Start: 2020-04-14 | End: 2020-08-31

## 2020-08-31 ENCOUNTER — TELEPHONE (OUTPATIENT)
Dept: FAMILY MEDICINE | Facility: CLINIC | Age: 55
End: 2020-08-31

## 2020-08-31 ENCOUNTER — OFFICE VISIT (OUTPATIENT)
Dept: FAMILY MEDICINE | Facility: CLINIC | Age: 55
End: 2020-08-31
Payer: MEDICAID

## 2020-08-31 VITALS
WEIGHT: 108.31 LBS | BODY MASS INDEX: 21.26 KG/M2 | HEART RATE: 83 BPM | TEMPERATURE: 99 F | HEIGHT: 60 IN | OXYGEN SATURATION: 97 % | SYSTOLIC BLOOD PRESSURE: 116 MMHG | DIASTOLIC BLOOD PRESSURE: 76 MMHG

## 2020-08-31 DIAGNOSIS — R30.0 DYSURIA: Primary | ICD-10-CM

## 2020-08-31 DIAGNOSIS — F51.04 CHRONIC INSOMNIA: ICD-10-CM

## 2020-08-31 DIAGNOSIS — Z12.11 ENCOUNTER FOR SCREENING COLONOSCOPY: ICD-10-CM

## 2020-08-31 DIAGNOSIS — N02.9 IDIOPATHIC HEMATURIA, UNSPECIFIED WHETHER GLOMERULAR MORPHOLOGIC CHANGES PRESENT: ICD-10-CM

## 2020-08-31 DIAGNOSIS — K66.0 ADHESION OF OMENTUM: ICD-10-CM

## 2020-08-31 LAB
BILIRUB UR QL STRIP: NEGATIVE
GLUCOSE UR QL STRIP: NEGATIVE
KETONES UR QL STRIP: NEGATIVE
LEUKOCYTE ESTERASE UR QL STRIP: NEGATIVE
PH, POC UA: 5 (ref 5–8.5)
POC BLOOD, URINE: POSITIVE
POC NITRATES, URINE: NEGATIVE
PROT UR QL STRIP: NEGATIVE
SP GR UR STRIP: 1.02 (ref 1–1.03)
UROBILINOGEN UR STRIP-ACNC: NORMAL (ref 0.2–8)

## 2020-08-31 PROCEDURE — 99214 PR OFFICE/OUTPT VISIT, EST, LEVL IV, 30-39 MIN: ICD-10-PCS | Mod: S$PBB,,, | Performed by: INTERNAL MEDICINE

## 2020-08-31 PROCEDURE — 99214 OFFICE O/P EST MOD 30 MIN: CPT | Performed by: INTERNAL MEDICINE

## 2020-08-31 PROCEDURE — 87077 CULTURE AEROBIC IDENTIFY: CPT

## 2020-08-31 PROCEDURE — 87186 SC STD MICRODIL/AGAR DIL: CPT

## 2020-08-31 PROCEDURE — 87086 URINE CULTURE/COLONY COUNT: CPT

## 2020-08-31 PROCEDURE — 99214 OFFICE O/P EST MOD 30 MIN: CPT | Mod: S$PBB,,, | Performed by: INTERNAL MEDICINE

## 2020-08-31 PROCEDURE — 81003 URINALYSIS AUTO W/O SCOPE: CPT | Mod: PBBFAC | Performed by: INTERNAL MEDICINE

## 2020-08-31 RX ORDER — ZOLPIDEM TARTRATE 6.25 MG/1
6.25 TABLET, FILM COATED, EXTENDED RELEASE ORAL NIGHTLY PRN
Qty: 30 TABLET | Refills: 5 | Status: SHIPPED | OUTPATIENT
Start: 2020-08-31 | End: 2022-06-20 | Stop reason: SDUPTHER

## 2020-08-31 RX ORDER — NITROFURANTOIN (MACROCRYSTALS) 100 MG/1
100 CAPSULE ORAL EVERY 12 HOURS
Qty: 10 CAPSULE | Refills: 0 | Status: SHIPPED | OUTPATIENT
Start: 2020-08-31 | End: 2020-09-05

## 2020-08-31 NOTE — PROGRESS NOTES
ADULT FOLLOW-UP VISIT    Subjective:     Chief Complaint:  Dysuria (uncomfortable and smells)      55-year-old woman here with a chief complaint of urinary frequency, mild burning, strong smell to her urine.  She states this is been going on for the past 3 weeks but has been getting worse over the past few days.  She denies pelvic pain, gross hematuria.  She has a previous history of recurrent microscopic hematuria for which no etiology was ever found.  She is otherwise healthy other than chronic insomnia.  This used to be well treated on Ambien 5 mg q.h.s., however she goes to sleep in Ambien but wakes up after 1-2 hours.  She is interested in trying the longer-acting formulation of Ambien.  She has also previously tried prescription medications including trazodone, diazepam, triazolam, and over-the-counter medications including diphenhydramine and melatonin.    Dysuria   Associated symptoms include frequency. Pertinent negatives include no behavior changes, chills, discharge, flank pain, hematuria, hesitancy, nausea, possible pregnancy, sweats, urgency, vomiting, weight loss, bubble bath use, constipation, rash or withholding.         Ms. Bello  has a past medical history of Insomnia and Wears contact lenses.    Family history and social history are reviewed and there are no significant changes.     ROS:  Review of Systems   Constitutional: Negative for chills and weight loss.   Gastrointestinal: Negative for constipation, nausea and vomiting.   Genitourinary: Positive for dysuria and frequency. Negative for flank pain, hematuria, hesitancy and urgency.   Skin: Negative for rash.          Current Outpatient Medications:     diazePAM (VALIUM) 10 MG Tab, , Disp: , Rfl:     nitrofurantoin (MACRODANTIN) 100 MG capsule, Take 1 capsule (100 mg total) by mouth every 12 (twelve) hours. for 5 days, Disp: 10 capsule, Rfl: 0    norethindrone (NEETA) 0.35 mg tablet, Neeta 0.35 mg  tablet, Disp: , Rfl:     triazolam (HALCION) 0.25 MG Tab, , Disp: , Rfl:     zolpidem (AMBIEN CR) 6.25 MG CR tablet, Take 1 tablet (6.25 mg total) by mouth nightly as needed for Insomnia., Disp: 30 tablet, Rfl: 5      Objective:     Physical Examination:     /76   Pulse 83   Temp 99 °F (37.2 °C) (Temporal)   Ht 5' (1.524 m)   Wt 49.1 kg (108 lb 4.8 oz)   SpO2 97%   BMI 21.15 kg/m²     Physical Exam  Constitutional:       General: She is not in acute distress.     Appearance: She is well-developed.   HENT:      Head: Normocephalic and atraumatic.      Nose: Nose normal.   Eyes:      Conjunctiva/sclera: Conjunctivae normal.      Pupils: Pupils are equal, round, and reactive to light.   Neck:      Musculoskeletal: Normal range of motion and neck supple.      Thyroid: No thyromegaly.   Cardiovascular:      Rate and Rhythm: Normal rate and regular rhythm.      Heart sounds: Normal heart sounds. No murmur.   Pulmonary:      Effort: Pulmonary effort is normal.      Breath sounds: Normal breath sounds.   Abdominal:      General: Bowel sounds are normal. There is no distension.      Palpations: Abdomen is soft. There is no mass.      Tenderness: There is no abdominal tenderness. There is no right CVA tenderness, left CVA tenderness or guarding.   Lymphadenopathy:      Cervical: No cervical adenopathy.   Skin:     General: Skin is warm and dry.   Neurological:      Mental Status: She is alert and oriented to person, place, and time.         Assessment/Plan:   Tatyana is a 55 y.o. female here for follow-up.    Problem List Items Addressed This Visit        Renal/    Dysuria - Primary    Current Assessment & Plan     Treat empirically with nitrofurantoin x5 days.  Urine sent for culture.  Urinalysis showed microscopic hematuria.         Relevant Medications    nitrofurantoin (MACRODANTIN) 100 MG capsule    Other Relevant Orders    POCT Urinalysis, Dipstick, Automated, W/O Scope (Completed)    Urine culture     Idiopathic hematuria    Current Assessment & Plan     Cystoscopy and CT urogram WNL.  Previously worked up by Urology.  Treat UTI today, repeat UA at next visit.            GI    Adhesion of omentum    Current Assessment & Plan     CT abdomen pelvis done in 2019 was within normal limits.  Recommendation from general surgery was to repeat CT in 3 months.  Repeat CT ordered.         Relevant Orders    CT Abdomen Pelvis With Contrast       Other    Chronic insomnia    Current Assessment & Plan     Ambien CR prescribed as patient has failed Ambien, trazodone, triazolam, diazepam, diphenhydramine, melatonin.         Relevant Medications    zolpidem (AMBIEN CR) 6.25 MG CR tablet      Other Visit Diagnoses     Encounter for screening colonoscopy        Relevant Orders    Ambulatory referral/consult to Gastroenterology          Health Maintenance   Topic Date Due    Hepatitis C Screening  1965    TETANUS VACCINE  05/11/1985    Mammogram  12/24/2021    Lipid Panel  12/24/2024           Discussion:     No follow-ups on file.    Goals    None         Electronically signed by Ruthie Carlos

## 2020-08-31 NOTE — ASSESSMENT & PLAN NOTE
CT abdomen pelvis done in 2019 was within normal limits.  Recommendation from general surgery was to repeat CT in 3 months.  Repeat CT ordered.

## 2020-08-31 NOTE — ASSESSMENT & PLAN NOTE
Treat empirically with nitrofurantoin x5 days.  Urine sent for culture.  Urinalysis showed microscopic hematuria.

## 2020-08-31 NOTE — ASSESSMENT & PLAN NOTE
Ambien CR prescribed as patient has failed Ambien, trazodone, triazolam, diazepam, diphenhydramine, melatonin.

## 2020-08-31 NOTE — TELEPHONE ENCOUNTER
.The following medication needs a prior authorization:     Medication Name: Zolpidem ( Ambien CR)     Dosage: 6.25mg CR    Frequency: nightly as needed     Directions for use: Take 1 tablet by mouth nightly as needed for Insomnia     Diagnosis: Chronic Insomnia F51.04    Is the request for a reauthorization? NO     Is the patient currently stable on therapy? NO     Please list all therapeutic alternatives previously used with start/end dates and outcome: Patient has used Ambien 5 mg with good effect but is waking after 1-2 hours.  She has also tried triazolam, diazepam, trazodone, diphenhydramine, melatonin without affect.

## 2020-08-31 NOTE — ASSESSMENT & PLAN NOTE
Cystoscopy and CT urogram WNL.  Previously worked up by Urology.  Treat UTI today, repeat UA at next visit.

## 2020-08-31 NOTE — Clinical Note
Expect PA on Ambien CR.  Patient has used Ambien 5 mg with good effect but is waking after 1-2 hours.  She has also tried triazolam, diazepam, trazodone, diphenhydramine, melatonin without affect.

## 2020-09-02 ENCOUNTER — TELEPHONE (OUTPATIENT)
Dept: FAMILY MEDICINE | Facility: CLINIC | Age: 55
End: 2020-09-02

## 2020-09-02 LAB — BACTERIA UR CULT: ABNORMAL

## 2020-09-02 NOTE — TELEPHONE ENCOUNTER
Spoke to patient and gave culture results and recommendations per the provider. Patient verbally stated that she understood.

## 2020-09-02 NOTE — TELEPHONE ENCOUNTER
----- Message from Ruthie Carlos MD sent at 9/2/2020  9:40 AM CDT -----  Please call patient with results. Urine culture grew E coli sensitive to nitrofurantoin treatment.

## 2020-09-14 ENCOUNTER — HOSPITAL ENCOUNTER (OUTPATIENT)
Dept: RADIOLOGY | Facility: HOSPITAL | Age: 55
Discharge: HOME | End: 2020-09-14
Attending: INTERNAL MEDICINE
Payer: MEDICAID

## 2020-09-14 ENCOUNTER — TELEPHONE (OUTPATIENT)
Dept: FAMILY MEDICINE | Facility: CLINIC | Age: 55
End: 2020-09-14

## 2020-09-14 DIAGNOSIS — K66.0 ADHESION OF OMENTUM: ICD-10-CM

## 2020-09-14 PROCEDURE — 25500020 PHARM REV CODE 255: Mod: PO | Performed by: INTERNAL MEDICINE

## 2020-09-14 PROCEDURE — 74178 CT ABD&PLV WO CNTR FLWD CNTR: CPT | Mod: TC,PO

## 2020-09-14 RX ADMIN — IOHEXOL 100 ML: 350 INJECTION, SOLUTION INTRAVENOUS at 10:09

## 2020-09-14 NOTE — TELEPHONE ENCOUNTER
----- Message from Ruthie Carlos MD sent at 9/14/2020 11:56 AM CDT -----  Please call patient with normal results.

## 2020-09-28 ENCOUNTER — OFFICE VISIT (OUTPATIENT)
Dept: FAMILY MEDICINE | Facility: CLINIC | Age: 55
End: 2020-09-28
Payer: MEDICAID

## 2020-09-28 VITALS
HEART RATE: 90 BPM | SYSTOLIC BLOOD PRESSURE: 112 MMHG | OXYGEN SATURATION: 98 % | WEIGHT: 108.69 LBS | DIASTOLIC BLOOD PRESSURE: 72 MMHG | HEIGHT: 60 IN | TEMPERATURE: 98 F | BODY MASS INDEX: 21.34 KG/M2

## 2020-09-28 DIAGNOSIS — N02.9 IDIOPATHIC HEMATURIA, UNSPECIFIED WHETHER GLOMERULAR MORPHOLOGIC CHANGES PRESENT: ICD-10-CM

## 2020-09-28 DIAGNOSIS — Z11.4 SCREENING FOR HIV (HUMAN IMMUNODEFICIENCY VIRUS): ICD-10-CM

## 2020-09-28 DIAGNOSIS — F51.04 CHRONIC INSOMNIA: ICD-10-CM

## 2020-09-28 DIAGNOSIS — Z11.59 ENCOUNTER FOR HEPATITIS C SCREENING TEST FOR LOW RISK PATIENT: ICD-10-CM

## 2020-09-28 DIAGNOSIS — R31.29 MICROSCOPIC HEMATURIA: Primary | ICD-10-CM

## 2020-09-28 DIAGNOSIS — Z13.220 SCREENING, LIPID: ICD-10-CM

## 2020-09-28 DIAGNOSIS — Z13.1 SCREENING FOR DIABETES MELLITUS: ICD-10-CM

## 2020-09-28 LAB
BILIRUB UR QL STRIP: POSITIVE
CLARITY UR: CLEAR
COLOR UR: YELLOW
GLUCOSE UR QL STRIP: NEGATIVE
KETONES UR QL STRIP: NEGATIVE
LEUKOCYTE ESTERASE UR QL STRIP: NEGATIVE
PH, POC UA: 5 (ref 5–8.5)
POC BLOOD, URINE: POSITIVE
POC NITRATES, URINE: NEGATIVE
PROT UR QL STRIP: NEGATIVE
SP GR UR STRIP: 1.02 (ref 1–1.03)
UROBILINOGEN UR STRIP-ACNC: NORMAL (ref 0.2–8)

## 2020-09-28 PROCEDURE — 99215 OFFICE O/P EST HI 40 MIN: CPT | Performed by: INTERNAL MEDICINE

## 2020-09-28 PROCEDURE — 81003 URINALYSIS AUTO W/O SCOPE: CPT | Mod: PBBFAC | Performed by: INTERNAL MEDICINE

## 2020-09-28 PROCEDURE — 87086 URINE CULTURE/COLONY COUNT: CPT

## 2020-09-28 PROCEDURE — 99214 PR OFFICE/OUTPT VISIT, EST, LEVL IV, 30-39 MIN: ICD-10-PCS | Mod: S$PBB,,, | Performed by: INTERNAL MEDICINE

## 2020-09-28 PROCEDURE — 99214 OFFICE O/P EST MOD 30 MIN: CPT | Mod: S$PBB,,, | Performed by: INTERNAL MEDICINE

## 2020-09-28 RX ORDER — TRAZODONE HYDROCHLORIDE 100 MG/1
100 TABLET ORAL NIGHTLY
Qty: 30 TABLET | Refills: 11 | Status: SHIPPED | OUTPATIENT
Start: 2020-09-28 | End: 2021-11-01

## 2020-09-28 NOTE — PROGRESS NOTES
ADULT FOLLOW-UP VISIT    Subjective:     Chief Complaint:  Follow-up (1 mo. f/u CT)      55-year-old woman with a history of chronic insomnia, microscopic hematuria here for routine follow-up.  Since last visit, patient had repeat CT of the abdomen and pelvis to the follow-up previously noted adhesions of the omentum.  CT was within normal limits.  Patient had hematuria on UA at last visit, however she also had an E coli UTI which was treated with nitrofurantoin.  Patient's urine today once again shows blood.  Patient denies any symptoms of infection currently.  For her insomnia, patient reports that she sleeps maybe another hour with Ambien CR compared to Ambien.  She is currently getting about 4-1/2 hours of sleep a night.    Follow-up  Associated symptoms include fatigue. Pertinent negatives include no abdominal pain, anorexia, arthralgias, change in bowel habit, chest pain, chills, congestion, coughing, diaphoresis, fever, headaches, joint swelling, myalgias, nausea, neck pain, numbness, rash, sore throat, swollen glands, urinary symptoms, vertigo, visual change, vomiting or weakness.         Ms. Bello  has a past medical history of Insomnia and Wears contact lenses.    Family history and social history are reviewed and there are no significant changes.     ROS:  Review of Systems   Constitutional: Positive for fatigue. Negative for chills, diaphoresis and fever.   HENT: Negative for congestion and sore throat.    Respiratory: Negative for cough.    Cardiovascular: Negative for chest pain.   Gastrointestinal: Negative for abdominal pain, anorexia, change in bowel habit, nausea and vomiting.   Genitourinary: Positive for difficulty urinating (dribbling after urinating) and hematuria. Negative for decreased urine volume, enuresis and flank pain.   Musculoskeletal: Negative for arthralgias, joint swelling, myalgias and neck pain.   Skin: Negative for rash.   Neurological:  Negative for vertigo, weakness, numbness and headaches.          Current Outpatient Medications:     norethindrone (NEETA) 0.35 mg tablet, Take 1 tablet by mouth once daily. , Disp: , Rfl:     zolpidem (AMBIEN CR) 6.25 MG CR tablet, Take 1 tablet (6.25 mg total) by mouth nightly as needed for Insomnia., Disp: 30 tablet, Rfl: 5    traZODone (DESYREL) 100 MG tablet, Take 1 tablet (100 mg total) by mouth every evening., Disp: 30 tablet, Rfl: 11      Objective:     Physical Examination:     /72 (BP Location: Left arm, Patient Position: Sitting, BP Method: Medium (Manual))   Pulse 90   Temp 97.7 °F (36.5 °C) (Temporal)   Ht 5' (1.524 m)   Wt 49.3 kg (108 lb 11.2 oz)   SpO2 98%   BMI 21.23 kg/m²     Physical Exam    Assessment/Plan:   Tatyana is a 55 y.o. female here for follow-up.    Problem List Items Addressed This Visit        Renal/    Idiopathic hematuria    Current Assessment & Plan     Cystoscopy and CT urogram WNL.  Previously worked up by Urology.  UA shows blood again today. Will send for culture. New urology referral placed.          Microscopic hematuria - Primary    Relevant Orders    POCT Urinalysis, Dipstick, Automated, W/O Scope (Completed)    CBC auto differential    Urine culture    Ambulatory referral/consult to Urology       Other    Chronic insomnia    Current Assessment & Plan     Change to trazodone. Pt previously tried 50mg w/o good effect. Will try 100mg.   Previously failed Ambien, trazodone, triazolam, diazepam, diphenhydramine, melatonin.         Relevant Medications    traZODone (DESYREL) 100 MG tablet      Other Visit Diagnoses     Screening for diabetes mellitus        Relevant Orders    Comprehensive metabolic panel    Screening, lipid        Relevant Orders    Lipid Panel    Encounter for hepatitis C screening test for low risk patient        Relevant Orders    Hepatitis C Antibody    Screening for HIV (human immunodeficiency virus)        Relevant Orders    HIV 1/2  Ag/Ab (4th Gen) w/Refl          Health Maintenance   Topic Date Due    Hepatitis C Screening  1965    TETANUS VACCINE  05/11/1985    Mammogram  12/24/2021    Lipid Panel  12/24/2024           Discussion:     Follow up in about 3 months (around 12/28/2020) for annual physical.    Goals    None         Electronically signed by Ruthie Carlos

## 2020-09-28 NOTE — ASSESSMENT & PLAN NOTE
Cystoscopy and CT urogram WNL.  Previously worked up by Urology.  UA shows blood again today. Will send for culture. New urology referral placed.

## 2020-09-28 NOTE — ASSESSMENT & PLAN NOTE
Change to trazodone. Pt previously tried 50mg w/o good effect. Will try 100mg.   Previously failed Ambien, trazodone, triazolam, diazepam, diphenhydramine, melatonin.

## 2020-09-28 NOTE — PATIENT INSTRUCTIONS
Mental Health Specialists:    [facility]   [address] [place] [address]    Phone: [phone]     [facility]        [address], [address], [place] [address] [phone]           [facility]       [address], [place] [address] [phone]      [facility]  [address], [place] [address]         Phone: [phone]                [facility] [employer] [address], [address], [place] [address] ([facility])         Phone: [phone]             [place] [address] [address], [place] [address]         Phone: [phone]

## 2020-09-28 NOTE — Clinical Note
This patient saw you in the past for microscopic hematuria. I wanted to refer her back to you, but she has medicaid and hasn't seen you in almost 2 year. Will you be able to see her?

## 2020-09-30 ENCOUNTER — TELEPHONE (OUTPATIENT)
Dept: FAMILY MEDICINE | Facility: CLINIC | Age: 55
End: 2020-09-30

## 2020-09-30 DIAGNOSIS — N02.9 IDIOPATHIC HEMATURIA, UNSPECIFIED WHETHER GLOMERULAR MORPHOLOGIC CHANGES PRESENT: Primary | ICD-10-CM

## 2020-09-30 NOTE — TELEPHONE ENCOUNTER
Spoke to patient and gave culture results. She verbally stated that she understood. Patient was instructed to come to the clinic next week to give another urine sample.

## 2020-09-30 NOTE — TELEPHONE ENCOUNTER
Urine culture negative. Please have pt come by the clinic next week to collect a urine sample for formal urinalysis.

## 2020-10-01 LAB — BACTERIA UR CULT: NO GROWTH

## 2020-10-05 ENCOUNTER — CLINICAL SUPPORT (OUTPATIENT)
Dept: FAMILY MEDICINE | Facility: CLINIC | Age: 55
End: 2020-10-05
Payer: MEDICAID

## 2020-10-05 DIAGNOSIS — R31.29 MICROSCOPIC HEMATURIA: Primary | ICD-10-CM

## 2020-10-05 LAB
BACTERIA #/AREA URNS HPF: ABNORMAL /HPF
HYALINE CASTS #/AREA URNS LPF: 10 /LPF
MICROSCOPIC COMMENT: ABNORMAL
RBC #/AREA URNS HPF: 1 /HPF (ref 0–4)
SQUAMOUS #/AREA URNS HPF: 8 /HPF
WBC #/AREA URNS HPF: 3 /HPF (ref 0–5)

## 2020-10-05 PROCEDURE — 81001 URINALYSIS AUTO W/SCOPE: CPT

## 2020-10-05 PROCEDURE — 99212 OFFICE O/P EST SF 10 MIN: CPT

## 2020-10-06 ENCOUNTER — TELEPHONE (OUTPATIENT)
Dept: FAMILY MEDICINE | Facility: CLINIC | Age: 55
End: 2020-10-06

## 2020-10-06 NOTE — TELEPHONE ENCOUNTER
Case discussed with Dr. Daigle. Pt had complete w/u for hematuria which was WNL. Her last + urine was in the setting of a UTI and repeat urinalysis this week was normal. Does not need to see another urologist unless there is further concern.

## 2020-10-08 ENCOUNTER — LAB VISIT (OUTPATIENT)
Dept: LAB | Facility: HOSPITAL | Age: 55
End: 2020-10-08
Attending: INTERNAL MEDICINE
Payer: MEDICAID

## 2020-10-08 DIAGNOSIS — R31.29 MICROSCOPIC HEMATURIA: ICD-10-CM

## 2020-10-08 DIAGNOSIS — Z11.59 ENCOUNTER FOR HEPATITIS C SCREENING TEST FOR LOW RISK PATIENT: ICD-10-CM

## 2020-10-08 DIAGNOSIS — Z13.220 SCREENING, LIPID: ICD-10-CM

## 2020-10-08 DIAGNOSIS — Z11.4 SCREENING FOR HIV (HUMAN IMMUNODEFICIENCY VIRUS): ICD-10-CM

## 2020-10-08 DIAGNOSIS — Z13.1 SCREENING FOR DIABETES MELLITUS: ICD-10-CM

## 2020-10-08 LAB
ALBUMIN SERPL BCP-MCNC: 4.1 G/DL (ref 3.5–5.2)
ALP SERPL-CCNC: 64 U/L (ref 55–135)
ALT SERPL W/O P-5'-P-CCNC: 10 U/L (ref 10–44)
ANION GAP SERPL CALC-SCNC: 11 MMOL/L (ref 8–16)
AST SERPL-CCNC: 19 U/L (ref 10–40)
BACTERIA #/AREA URNS HPF: ABNORMAL /HPF
BASOPHILS # BLD AUTO: 0.04 K/UL (ref 0–0.2)
BASOPHILS NFR BLD: 0.8 % (ref 0–1.9)
BILIRUB SERPL-MCNC: 1.8 MG/DL (ref 0.1–1)
BUN SERPL-MCNC: 13 MG/DL (ref 6–20)
CALCIUM SERPL-MCNC: 9 MG/DL (ref 8.7–10.5)
CHLORIDE SERPL-SCNC: 104 MMOL/L (ref 95–110)
CHOLEST SERPL-MCNC: 163 MG/DL (ref 120–199)
CHOLEST/HDLC SERPL: 2.5 {RATIO} (ref 2–5)
CO2 SERPL-SCNC: 24 MMOL/L (ref 23–29)
CREAT SERPL-MCNC: 0.6 MG/DL (ref 0.5–1.4)
DIFFERENTIAL METHOD: ABNORMAL
EOSINOPHIL # BLD AUTO: 0.1 K/UL (ref 0–0.5)
EOSINOPHIL NFR BLD: 1.5 % (ref 0–8)
ERYTHROCYTE [DISTWIDTH] IN BLOOD BY AUTOMATED COUNT: 11.8 % (ref 11.5–14.5)
EST. GFR  (AFRICAN AMERICAN): >60 ML/MIN/1.73 M^2
EST. GFR  (NON AFRICAN AMERICAN): >60 ML/MIN/1.73 M^2
GLUCOSE SERPL-MCNC: 98 MG/DL (ref 70–110)
HCT VFR BLD AUTO: 39 % (ref 37–48.5)
HDLC SERPL-MCNC: 65 MG/DL (ref 40–75)
HDLC SERPL: 39.9 % (ref 20–50)
HGB BLD-MCNC: 12.9 G/DL (ref 12–16)
HYALINE CASTS #/AREA URNS LPF: 20 /LPF
IMM GRANULOCYTES # BLD AUTO: 0.01 K/UL (ref 0–0.04)
IMM GRANULOCYTES NFR BLD AUTO: 0.2 % (ref 0–0.5)
LDLC SERPL CALC-MCNC: 84.2 MG/DL (ref 63–159)
LYMPHOCYTES # BLD AUTO: 2.2 K/UL (ref 1–4.8)
LYMPHOCYTES NFR BLD: 43.3 % (ref 18–48)
MCH RBC QN AUTO: 32.1 PG (ref 27–31)
MCHC RBC AUTO-ENTMCNC: 33.1 G/DL (ref 32–36)
MCV RBC AUTO: 97 FL (ref 82–98)
MICROSCOPIC COMMENT: ABNORMAL
MONOCYTES # BLD AUTO: 0.4 K/UL (ref 0.3–1)
MONOCYTES NFR BLD: 8.1 % (ref 4–15)
NEUTROPHILS # BLD AUTO: 2.4 K/UL (ref 1.8–7.7)
NEUTROPHILS NFR BLD: 46.1 % (ref 38–73)
NONHDLC SERPL-MCNC: 98 MG/DL
NRBC BLD-RTO: 0 /100 WBC
PLATELET # BLD AUTO: 206 K/UL (ref 150–350)
PMV BLD AUTO: 10.6 FL (ref 9.2–12.9)
POTASSIUM SERPL-SCNC: 3.8 MMOL/L (ref 3.5–5.1)
PROT SERPL-MCNC: 7.1 G/DL (ref 6–8.4)
RBC # BLD AUTO: 4.02 M/UL (ref 4–5.4)
RBC #/AREA URNS HPF: 1 /HPF (ref 0–4)
SODIUM SERPL-SCNC: 139 MMOL/L (ref 136–145)
SQUAMOUS #/AREA URNS HPF: 10 /HPF
TRIGL SERPL-MCNC: 69 MG/DL (ref 30–150)
WBC # BLD AUTO: 5.17 K/UL (ref 3.9–12.7)
WBC #/AREA URNS HPF: 4 /HPF (ref 0–5)

## 2020-10-08 PROCEDURE — 80061 LIPID PANEL: CPT

## 2020-10-08 PROCEDURE — 86803 HEPATITIS C AB TEST: CPT

## 2020-10-08 PROCEDURE — 80053 COMPREHEN METABOLIC PANEL: CPT

## 2020-10-08 PROCEDURE — 87389 HIV-1 AG W/HIV-1&-2 AB AG IA: CPT

## 2020-10-08 PROCEDURE — 85025 COMPLETE CBC W/AUTO DIFF WBC: CPT

## 2020-10-08 PROCEDURE — 81001 URINALYSIS AUTO W/SCOPE: CPT

## 2020-10-09 LAB
HCV AB S/CO SERPL IA: <0.1 S/CO RATIO (ref 0–0.9)
HIV 1+2 AB+HIV1 P24 AG SERPL QL IA: NON REACTIVE

## 2020-10-12 ENCOUNTER — TELEPHONE (OUTPATIENT)
Dept: FAMILY MEDICINE | Facility: CLINIC | Age: 55
End: 2020-10-12

## 2020-10-12 NOTE — TELEPHONE ENCOUNTER
----- Message from Ruthie Carlos MD sent at 10/12/2020 10:32 AM CDT -----  Please call patient with normal results.

## 2021-01-08 ENCOUNTER — OFFICE VISIT (OUTPATIENT)
Dept: FAMILY MEDICINE | Facility: CLINIC | Age: 56
End: 2021-01-08
Payer: MEDICAID

## 2021-01-08 VITALS
HEART RATE: 80 BPM | WEIGHT: 110 LBS | DIASTOLIC BLOOD PRESSURE: 64 MMHG | OXYGEN SATURATION: 98 % | TEMPERATURE: 98 F | HEIGHT: 60 IN | BODY MASS INDEX: 21.6 KG/M2 | SYSTOLIC BLOOD PRESSURE: 104 MMHG

## 2021-01-08 DIAGNOSIS — Z00.00 ANNUAL PHYSICAL EXAM: Primary | ICD-10-CM

## 2021-01-08 DIAGNOSIS — M72.2 PLANTAR FASCIITIS: ICD-10-CM

## 2021-01-08 DIAGNOSIS — F51.04 CHRONIC INSOMNIA: ICD-10-CM

## 2021-01-08 DIAGNOSIS — Z12.31 SCREENING MAMMOGRAM, ENCOUNTER FOR: ICD-10-CM

## 2021-01-08 PROBLEM — N02.9 IDIOPATHIC HEMATURIA: Status: RESOLVED | Noted: 2017-10-31 | Resolved: 2021-01-08

## 2021-01-08 PROBLEM — R30.0 DYSURIA: Status: RESOLVED | Noted: 2017-10-31 | Resolved: 2021-01-08

## 2021-01-08 PROCEDURE — 99396 PREV VISIT EST AGE 40-64: CPT | Mod: S$PBB,,, | Performed by: INTERNAL MEDICINE

## 2021-01-08 PROCEDURE — 99396 PR PREVENTIVE VISIT,EST,40-64: ICD-10-PCS | Mod: S$PBB,,, | Performed by: INTERNAL MEDICINE

## 2021-01-08 PROCEDURE — 99214 OFFICE O/P EST MOD 30 MIN: CPT | Performed by: INTERNAL MEDICINE

## 2021-01-08 RX ORDER — NAPROXEN 500 MG/1
500 TABLET ORAL 2 TIMES DAILY
Qty: 14 TABLET | Refills: 2 | Status: SHIPPED | OUTPATIENT
Start: 2021-01-08 | End: 2021-01-15

## 2021-01-22 ENCOUNTER — HOSPITAL ENCOUNTER (OUTPATIENT)
Dept: RADIOLOGY | Facility: HOSPITAL | Age: 56
Discharge: HOME | End: 2021-01-22
Attending: INTERNAL MEDICINE
Payer: MEDICAID

## 2021-01-22 ENCOUNTER — TELEPHONE (OUTPATIENT)
Dept: FAMILY MEDICINE | Facility: CLINIC | Age: 56
End: 2021-01-22

## 2021-01-22 DIAGNOSIS — Z12.31 SCREENING MAMMOGRAM, ENCOUNTER FOR: ICD-10-CM

## 2021-01-22 PROCEDURE — 77067 SCR MAMMO BI INCL CAD: CPT | Mod: TC,PO

## 2021-05-03 ENCOUNTER — LAB VISIT (OUTPATIENT)
Dept: LAB | Facility: HOSPITAL | Age: 56
End: 2021-05-03
Attending: OBSTETRICS & GYNECOLOGY
Payer: MEDICAID

## 2021-05-03 DIAGNOSIS — N95.9 MENOPAUSAL PROBLEM: Primary | ICD-10-CM

## 2021-05-03 PROCEDURE — 36415 COLL VENOUS BLD VENIPUNCTURE: CPT | Performed by: OBSTETRICS & GYNECOLOGY

## 2021-05-03 PROCEDURE — 83001 ASSAY OF GONADOTROPIN (FSH): CPT | Performed by: OBSTETRICS & GYNECOLOGY

## 2021-05-05 LAB — FSH SERPL-ACNC: 78.7 MIU/ML

## 2021-05-12 ENCOUNTER — PATIENT MESSAGE (OUTPATIENT)
Dept: RESEARCH | Facility: HOSPITAL | Age: 56
End: 2021-05-12

## 2022-03-22 DIAGNOSIS — Z12.31 SCREENING MAMMOGRAM FOR HIGH-RISK PATIENT: Primary | ICD-10-CM

## 2022-03-22 LAB
HUMAN PAPILLOMAVIRUS (HPV): NORMAL
PAP RECOMMENDATION EXT: NORMAL
PAP SMEAR: NORMAL

## 2022-04-07 ENCOUNTER — HOSPITAL ENCOUNTER (OUTPATIENT)
Dept: RADIOLOGY | Facility: HOSPITAL | Age: 57
Discharge: HOME | End: 2022-04-07
Attending: OBSTETRICS & GYNECOLOGY
Payer: MEDICAID

## 2022-04-07 DIAGNOSIS — Z12.31 SCREENING MAMMOGRAM FOR HIGH-RISK PATIENT: ICD-10-CM

## 2022-04-07 PROCEDURE — 77067 SCR MAMMO BI INCL CAD: CPT | Mod: TC,PO

## 2022-04-07 PROCEDURE — 77063 BREAST TOMOSYNTHESIS BI: CPT | Mod: TC,PO

## 2022-05-18 ENCOUNTER — TELEPHONE (OUTPATIENT)
Dept: OBSTETRICS AND GYNECOLOGY | Facility: CLINIC | Age: 57
End: 2022-05-18
Payer: MEDICAID

## 2022-05-18 DIAGNOSIS — F51.04 CHRONIC INSOMNIA: ICD-10-CM

## 2022-05-18 NOTE — TELEPHONE ENCOUNTER
----- Message from Yael Valentino MA sent at 5/18/2022  1:34 PM CDT -----  Type: Needs Medical Advice  Who Called:  Tatyana Westbrook Call Back Number: [phone]  Additional Information: patient would like to speak with someone in the office

## 2022-05-19 RX ORDER — TRAZODONE HYDROCHLORIDE 100 MG/1
TABLET ORAL
Qty: 30 TABLET | Refills: 0 | Status: SHIPPED | OUTPATIENT
Start: 2022-05-19 | End: 2022-06-20 | Stop reason: SDUPTHER

## 2022-05-19 NOTE — TELEPHONE ENCOUNTER
Refill provided as requested. Please call patient to let them know that they will need to schedule with new PCP for further refills.

## 2022-06-20 ENCOUNTER — OFFICE VISIT (OUTPATIENT)
Dept: FAMILY MEDICINE | Facility: CLINIC | Age: 57
End: 2022-06-20
Payer: MEDICAID

## 2022-06-20 VITALS
HEIGHT: 60 IN | SYSTOLIC BLOOD PRESSURE: 120 MMHG | DIASTOLIC BLOOD PRESSURE: 82 MMHG | BODY MASS INDEX: 20.22 KG/M2 | HEART RATE: 77 BPM | WEIGHT: 103 LBS | OXYGEN SATURATION: 97 % | RESPIRATION RATE: 18 BRPM

## 2022-06-20 DIAGNOSIS — Z12.11 ENCOUNTER FOR SCREENING COLONOSCOPY: ICD-10-CM

## 2022-06-20 DIAGNOSIS — F51.04 CHRONIC INSOMNIA: ICD-10-CM

## 2022-06-20 DIAGNOSIS — Z00.00 ROUTINE HEALTH MAINTENANCE: ICD-10-CM

## 2022-06-20 DIAGNOSIS — R31.29 MICROSCOPIC HEMATURIA: ICD-10-CM

## 2022-06-20 DIAGNOSIS — N95.1 PERIMENOPAUSE: ICD-10-CM

## 2022-06-20 DIAGNOSIS — R92.8 ABNORMAL MAMMOGRAM OF RIGHT BREAST: Primary | ICD-10-CM

## 2022-06-20 PROCEDURE — 3008F PR BODY MASS INDEX (BMI) DOCUMENTED: ICD-10-PCS | Mod: CPTII,,, | Performed by: NURSE PRACTITIONER

## 2022-06-20 PROCEDURE — 3008F BODY MASS INDEX DOCD: CPT | Mod: CPTII,,, | Performed by: NURSE PRACTITIONER

## 2022-06-20 PROCEDURE — 99214 OFFICE O/P EST MOD 30 MIN: CPT | Performed by: NURSE PRACTITIONER

## 2022-06-20 PROCEDURE — 1159F PR MEDICATION LIST DOCUMENTED IN MEDICAL RECORD: ICD-10-PCS | Mod: CPTII,,, | Performed by: NURSE PRACTITIONER

## 2022-06-20 PROCEDURE — 99213 OFFICE O/P EST LOW 20 MIN: CPT | Mod: S$PBB,,, | Performed by: NURSE PRACTITIONER

## 2022-06-20 PROCEDURE — 99213 PR OFFICE/OUTPT VISIT, EST, LEVL III, 20-29 MIN: ICD-10-PCS | Mod: S$PBB,,, | Performed by: NURSE PRACTITIONER

## 2022-06-20 PROCEDURE — 1159F MED LIST DOCD IN RCRD: CPT | Mod: CPTII,,, | Performed by: NURSE PRACTITIONER

## 2022-06-20 RX ORDER — ZOLPIDEM TARTRATE 6.25 MG/1
6.25 TABLET, FILM COATED, EXTENDED RELEASE ORAL NIGHTLY PRN
Qty: 30 TABLET | Refills: 0 | Status: SHIPPED | OUTPATIENT
Start: 2022-06-20 | End: 2023-01-23

## 2022-06-20 RX ORDER — TRAZODONE HYDROCHLORIDE 100 MG/1
100 TABLET ORAL NIGHTLY
Qty: 30 TABLET | Refills: 3 | Status: SHIPPED | OUTPATIENT
Start: 2022-06-20 | End: 2022-10-18

## 2022-06-20 NOTE — PROGRESS NOTES
SUBJECTIVE:    Patient ID: Tatyana Bello is a 57 y.o. female.    Chief Complaint: No chief complaint on file.    Mrs. Bello returns today for annual. She was a former patient of Dr. Carlos. She states she is doing well at the time of this visit. Her only chronic complaint continues to be insomnia. She had a mammogram in April that required an ultrasound to further assess abnormalities and her previous provider was unable to place order so is requesting it be done today. She states she is out of medication for menopausal symptoms that was also handled by the same physician. I will place one time order until she is established with new provider. She has appointment scheduled in July. She is due for colorectal screening and she is requesting for that referral to be placed today as well.     We reviewed all overdue health maintenance.     FINDINGS:  The breasts are heterogeneously dense which may obscure small masses.  In the right breast there is a small focal asymmetry on the MLO view, slightly more conspicuous than on the previous exam subareolar position.  In the left breast there are no suspicious calcifications or masses detected.     Impression:     Focal asymmetry in the right breast for which spot compression mammography and possible follow-up ultrasound would be warranted.       No visits with results within 6 Month(s) from this visit.   Latest known visit with results is:   Lab Visit on 05/03/2021   Component Date Value Ref Range Status    Follicle Stimulating Hormone 05/03/2021 78.7  mIU/mL Final       Past Medical History:   Diagnosis Date    Insomnia     Wears contact lenses      Social History     Socioeconomic History    Marital status:     Number of children: 1   Tobacco Use    Smoking status: Former Smoker    Smokeless tobacco: Never Used   Substance and Sexual Activity    Alcohol use: Yes    Drug use: No    Sexual activity: Yes     Partners: Male     Past Surgical History:    Procedure Laterality Date    BREAST BIOPSY Right     CARDIAC ELECTROPHYSIOLOGY STUDY AND ABLATION      DIAGNOSTIC LAPAROSCOPY N/A 12/19/2018    Procedure: LAPAROSCOPY, DIAGNOSTIC;  Surgeon: Christy Garcia MD;  [facility]: [facility];  Service: OB/GYN;  Laterality: N/A;     Family History   Problem Relation Age of Onset    Stroke Father     Breast cancer Sister     Bone cancer Sister     Colon cancer Brother     Diabetes Mother     Hypertension Mother     Stroke Maternal Grandmother     Stroke Maternal Grandfather        Review of patient's allergies indicates:  No Known Allergies    Current Outpatient Medications:     estrogen, conjugated,-medroxyprogesterone 0.3-1.5 mg (PREMPRO) 0.3-1.5 mg per tablet, Prempro 0.3 mg-1.5 mg tablet  Take 1 tablet every day by oral route., Disp: 30 tablet, Rfl: 0    traZODone (DESYREL) 100 MG tablet, Take 1 tablet (100 mg total) by mouth every evening., Disp: 30 tablet, Rfl: 3    zolpidem (AMBIEN CR) 6.25 MG CR tablet, Take 1 tablet (6.25 mg total) by mouth nightly as needed for Insomnia., Disp: 30 tablet, Rfl: 0    Review of Systems   Constitutional: Negative for activity change, appetite change, fatigue, fever and unexpected weight change.   HENT: Negative for congestion, mouth sores, nosebleeds, postnasal drip, rhinorrhea, sinus pressure, sinus pain, sneezing, sore throat and trouble swallowing.    Eyes: Negative for pain, redness and itching.   Respiratory: Negative for chest tightness and shortness of breath.    Cardiovascular: Negative for chest pain and palpitations.   Gastrointestinal: Negative for abdominal pain, blood in stool, constipation, diarrhea, nausea and vomiting.   Endocrine: Negative for cold intolerance, heat intolerance, polydipsia, polyphagia and polyuria.   Genitourinary: Negative for difficulty urinating, dysuria, flank pain, frequency, genital sores, menstrual problem, urgency, vaginal bleeding and vaginal discharge.   Musculoskeletal:  Positive for arthralgias (mild arthralgias in right hand. Has not taken anything ). Negative for myalgias.   Skin: Negative for rash and wound.   Allergic/Immunologic: Negative for environmental allergies and food allergies.   Neurological: Negative for dizziness, light-headedness and headaches.   Hematological: Negative for adenopathy. Does not bruise/bleed easily.   Psychiatric/Behavioral: Positive for sleep disturbance (chronic insomnia. on medication ). Negative for agitation, confusion, hallucinations and self-injury. The patient is not nervous/anxious.           Objective:      Vitals:    06/20/22 0900   BP: 120/82   Pulse: 77   Resp: 18   SpO2: 97%   Weight: 46.7 kg (103 lb)   Height: 5' (1.524 m)     Physical Exam  Vitals reviewed.   Constitutional:       General: She is not in acute distress.     Appearance: Normal appearance. She is normal weight. She is not ill-appearing, toxic-appearing or diaphoretic.   HENT:      Head: Normocephalic and atraumatic.      Right Ear: Tympanic membrane and external ear normal. There is no impacted cerumen.      Left Ear: Tympanic membrane and external ear normal. There is no impacted cerumen.      Nose: Nose normal. No congestion or rhinorrhea.      Mouth/Throat:      Mouth: Mucous membranes are moist.      Pharynx: Oropharynx is clear. No oropharyngeal exudate or posterior oropharyngeal erythema.   Eyes:      General: No scleral icterus.        Right eye: No discharge.         Left eye: No discharge.      Extraocular Movements: Extraocular movements intact.      Conjunctiva/sclera: Conjunctivae normal.      Pupils: Pupils are equal, round, and reactive to light.   Neck:      Vascular: No carotid bruit.   Cardiovascular:      Rate and Rhythm: Normal rate and regular rhythm.      Pulses: Normal pulses.      Heart sounds: Normal heart sounds. No murmur heard.    No friction rub. No gallop.   Pulmonary:      Effort: Pulmonary effort is normal. No respiratory distress.       Breath sounds: Normal breath sounds. No stridor. No rales.   Chest:      Chest wall: No tenderness.   Abdominal:      General: Abdomen is flat. Bowel sounds are normal.      Palpations: Abdomen is soft. There is no mass.      Tenderness: There is no abdominal tenderness. There is no guarding.   Musculoskeletal:         General: No swelling or signs of injury. Normal range of motion.      Cervical back: Normal range of motion and neck supple. No rigidity or tenderness.      Right lower leg: No edema.      Left lower leg: No edema.   Skin:     General: Skin is warm and dry.      Capillary Refill: Capillary refill takes less than 2 seconds.      Findings: No bruising, lesion or rash.   Neurological:      General: No focal deficit present.      Mental Status: She is alert and oriented to person, place, and time. Mental status is at baseline.      Motor: No weakness.      Coordination: Coordination normal.   Psychiatric:         Mood and Affect: Mood normal.         Behavior: Behavior normal.         Thought Content: Thought content normal.         Judgment: Judgment normal.           Assessment:       1. Abnormal mammogram of right breast    2. Chronic insomnia    3. Routine health maintenance    4. Encounter for screening colonoscopy    5. Microscopic hematuria    6. Perimenopause         Plan:       Abnormal mammogram of right breast  -     US Breast Right Limited; Future; Expected date: 06/20/2022    Chronic insomnia  -     traZODone (DESYREL) 100 MG tablet; Take 1 tablet (100 mg total) by mouth every evening.  Dispense: 30 tablet; Refill: 3  -     zolpidem (AMBIEN CR) 6.25 MG CR tablet; Take 1 tablet (6.25 mg total) by mouth nightly as needed for Insomnia.  Dispense: 30 tablet; Refill: 0    Routine health maintenance  -     CBC Auto Differential; Future; Expected date: 06/20/2022  -     Comprehensive Metabolic Panel; Future; Expected date: 06/20/2022  -     Lipid Panel; Future; Expected date: 06/20/2022  -      Urinalysis; Future; Expected date: 06/20/2022  -     TSH; Future; Expected date: 06/20/2022    Encounter for screening colonoscopy  -     Ambulatory referral/consult to Gastroenterology; Future; Expected date: 06/27/2022    Microscopic hematuria  -     Urinalysis; Future; Expected date: 06/20/2022    Perimenopause  -     estrogen, conjugated,-medroxyprogesterone 0.3-1.5 mg (PREMPRO) 0.3-1.5 mg per tablet; Prempro 0.3 mg-1.5 mg tablet  Take 1 tablet every day by oral route.  Dispense: 30 tablet; Refill: 0      Follow up in about 6 months (around 12/20/2022), or if symptoms worsen or fail to improve, for Lab review/Insomnia.      Counseled on heart healthy diet rich in fiber, fresh vegetables and fruit and low in saturated fats (fried foods, red meat, etc.).  I also recommend regular exercise including a minimum of 150 minutes of cardio exercise per week and 20-30 minute workouts of strength training like light weights, yoga, pilates, etc.        6/20/2022 IVIS Pisano, FNP-C

## 2022-06-21 ENCOUNTER — TELEPHONE (OUTPATIENT)
Dept: FAMILY MEDICINE | Facility: CLINIC | Age: 57
End: 2022-06-21

## 2022-06-21 DIAGNOSIS — R92.8 ABNORMAL MAMMOGRAM: Primary | ICD-10-CM

## 2022-06-21 NOTE — TELEPHONE ENCOUNTER
----- Message from Dayanara Mitchell sent at 6/21/2022  3:26 PM CDT -----  Regarding: RE: need Diagnostic Mammo Right order to accompany US Breast Tatyana Montelongo, so sorry , I guess I failed to attach her to the message ... MRN# [id number]  ----- Message -----  From: Rizwana Hu LPN  Sent: 6/21/2022   3:21 PM CDT  To: Dayanara Mitchell  Subject: RE: need Diagnostic Mammo Right order to acc#    Who is the patient?  ----- Message -----  From: Dayanara Mitchell  Sent: 6/21/2022   9:19 AM CDT  To: Brittany Mello Staff  Subject: need Diagnostic Mammo Right order to accompa#    Diagnostic Mammograms & Breast Ultrasounds are ordered together.     We received the US Breast Right order yesterday, please add the accompanying Diagnostic Mammo Right, using the same DX code of R92.8.    Thank you,Dayanara at [facility] Scheduling  [phone]

## 2022-06-22 ENCOUNTER — TELEPHONE (OUTPATIENT)
Dept: FAMILY MEDICINE | Facility: CLINIC | Age: 57
End: 2022-06-22

## 2022-06-22 NOTE — TELEPHONE ENCOUNTER
The following medication needs a prior authorization:     Medication Name: zolpidem    Dosage: 6.25 mg    Frequency: daily    Directions for use: Take 1 tablet (6.25 mg total) by mouth nightly as needed for Insomnia    Diagnosis: Insomnia    Is the request for a reauthorization? no    Is the patient currently stable on therapy? yes    Please list all therapeutic alternatives previously used with start/end dates and outcome: trazodone

## 2022-07-11 ENCOUNTER — OFFICE VISIT (OUTPATIENT)
Dept: OBSTETRICS AND GYNECOLOGY | Facility: CLINIC | Age: 57
End: 2022-07-11
Payer: MEDICAID

## 2022-07-11 VITALS
BODY MASS INDEX: 20.69 KG/M2 | RESPIRATION RATE: 17 BRPM | WEIGHT: 105.38 LBS | SYSTOLIC BLOOD PRESSURE: 110 MMHG | DIASTOLIC BLOOD PRESSURE: 62 MMHG | HEART RATE: 60 BPM | HEIGHT: 60 IN

## 2022-07-11 DIAGNOSIS — Z78.0 MENOPAUSE: Primary | ICD-10-CM

## 2022-07-11 PROBLEM — N95.1 PERIMENOPAUSE: Status: RESOLVED | Noted: 2017-10-31 | Resolved: 2022-07-11

## 2022-07-11 PROCEDURE — 99213 OFFICE O/P EST LOW 20 MIN: CPT | Mod: PBBFAC,PO | Performed by: OBSTETRICS & GYNECOLOGY

## 2022-07-11 PROCEDURE — 99999 PR PBB SHADOW E&M-EST. PATIENT-LVL III: CPT | Mod: PBBFAC,,, | Performed by: OBSTETRICS & GYNECOLOGY

## 2022-07-11 PROCEDURE — 99203 OFFICE O/P NEW LOW 30 MIN: CPT | Mod: S$PBB,,, | Performed by: OBSTETRICS & GYNECOLOGY

## 2022-07-11 PROCEDURE — 99999 PR PBB SHADOW E&M-EST. PATIENT-LVL III: ICD-10-PCS | Mod: PBBFAC,,, | Performed by: OBSTETRICS & GYNECOLOGY

## 2022-07-11 PROCEDURE — 3078F PR MOST RECENT DIASTOLIC BLOOD PRESSURE < 80 MM HG: ICD-10-PCS | Mod: CPTII,,, | Performed by: OBSTETRICS & GYNECOLOGY

## 2022-07-11 PROCEDURE — 1159F MED LIST DOCD IN RCRD: CPT | Mod: CPTII,,, | Performed by: OBSTETRICS & GYNECOLOGY

## 2022-07-11 PROCEDURE — 3008F PR BODY MASS INDEX (BMI) DOCUMENTED: ICD-10-PCS | Mod: CPTII,,, | Performed by: OBSTETRICS & GYNECOLOGY

## 2022-07-11 PROCEDURE — 3074F SYST BP LT 130 MM HG: CPT | Mod: CPTII,,, | Performed by: OBSTETRICS & GYNECOLOGY

## 2022-07-11 PROCEDURE — 1159F PR MEDICATION LIST DOCUMENTED IN MEDICAL RECORD: ICD-10-PCS | Mod: CPTII,,, | Performed by: OBSTETRICS & GYNECOLOGY

## 2022-07-11 PROCEDURE — 3074F PR MOST RECENT SYSTOLIC BLOOD PRESSURE < 130 MM HG: ICD-10-PCS | Mod: CPTII,,, | Performed by: OBSTETRICS & GYNECOLOGY

## 2022-07-11 PROCEDURE — 99203 PR OFFICE/OUTPT VISIT, NEW, LEVL III, 30-44 MIN: ICD-10-PCS | Mod: S$PBB,,, | Performed by: OBSTETRICS & GYNECOLOGY

## 2022-07-11 PROCEDURE — 3078F DIAST BP <80 MM HG: CPT | Mod: CPTII,,, | Performed by: OBSTETRICS & GYNECOLOGY

## 2022-07-11 PROCEDURE — 3008F BODY MASS INDEX DOCD: CPT | Mod: CPTII,,, | Performed by: OBSTETRICS & GYNECOLOGY

## 2022-07-11 NOTE — PROGRESS NOTES
Subjective:   Chief Complaint:  Well Woman       Patient ID: Tatyana Bello is a  57 y.o. female.    2022     She presents today due to the following:    She was previously seen by Dr. Haywood and has had a annual exam within the year.  She is currently on Prempro for approximately 1 year due to menopausal symptoms.  Primarily hot flashes and issues with atrophic vaginitis.  She requires a refill on this medication.  She had a recent abnormality on mammogram and follow-up breast ultrasound is pending.      GYN & OB History  No LMP recorded (lmp unknown). Patient is perimenopausal.     Date of Last Pap: No result found    OB History    Para Term  AB Living   2 1 1   1 1   SAB IAB Ectopic Multiple Live Births   1              # Outcome Date GA Lbr Pollo/2nd Weight Sex Delivery Anes PTL Lv   2 Term            1 SAB               Obstetric Comments   Vaginal delivery x1 and SAB x1         Past Medical History:   Diagnosis Date    Insomnia     Wears contact lenses         Past Surgical History:   Procedure Laterality Date    BREAST BIOPSY Right     CARDIAC ELECTROPHYSIOLOGY STUDY AND ABLATION      DIAGNOSTIC LAPAROSCOPY N/A 2018    Procedure: LAPAROSCOPY, DIAGNOSTIC;  Surgeon: Christy Garcia MD;  Location: [facility];  Service: OB/GYN;  Laterality: N/A;        Review of patient's allergies indicates:  No Known Allergies     Medications    Current Outpatient Medications:     traZODone (DESYREL) 100 MG tablet, Take 1 tablet (100 mg total) by mouth every evening., Disp: 30 tablet, Rfl: 3    zolpidem (AMBIEN CR) 6.25 MG CR tablet, Take 1 tablet (6.25 mg total) by mouth nightly as needed for Insomnia., Disp: 30 tablet, Rfl: 0    estrogen, conjugated,-medroxyprogesterone 0.3-1.5 mg (PREMPRO) 0.3-1.5 mg per tablet, Prempro 0.3 mg-1.5 mg tablet  Take 1 tablet every day by oral route., Disp: 90 tablet, Rfl: 3       Review of Systems  Review of Systems   Constitutional: Negative for  fever and unexpected weight change.   HENT: Negative.    Respiratory: Negative for cough and shortness of breath.    Cardiovascular: Negative for chest pain.   Gastrointestinal: Negative for abdominal pain, nausea and vomiting.   Genitourinary: Negative for dysuria and urgency.          Gyn as per HPI   Musculoskeletal: Negative for myalgias.   Integumentary:  Negative for rash.   Neurological: Negative.  Negative for headaches.   Breast: negative.         Objective:      Vitals:    07/11/22 1418   BP: 110/62   Pulse: 60   Resp: 17     Physical Exam:   Constitutional: She appears well-developed and well-nourished.    HENT:   Head: Normocephalic.     Neck: No thyroid mass and no thyromegaly present.    Cardiovascular: Normal rate, regular rhythm and normal heart sounds.     Pulmonary/Chest: Effort normal and breath sounds normal. No respiratory distress.        Abdominal: Soft. There is no abdominal tenderness.             Musculoskeletal:      Right lower leg: No edema.      Left lower leg: No edema.       Neurological: She is alert.    Skin: Skin is warm and dry.    Psychiatric: Mood normal.         Assessment:        1. Menopause         Plan:      Menopause  -     estrogen, conjugated,-medroxyprogesterone 0.3-1.5 mg (PREMPRO) 0.3-1.5 mg per tablet; Prempro 0.3 mg-1.5 mg tablet  Take 1 tablet every day by oral route.  Dispense: 90 tablet; Refill: 3         Follow up in about 8 months (around 3/11/2023) for as needed or for annual exam.     The above was reviewed and discussed with the patient.    We discussed her issues with symptomatic menopause.    At this time we will proceed as follows:  The pros cons risks benefits and alternatives to estrogen and progesterone hormone replacement therapy reviewed discussed and at this time the patient would like to continue on Prempro.  Proper use, potential side effects and issues associated with medications prescribed were reviewed and discussed with the patient.      Her  questions were answered, and she is in agreement with the plan.

## 2022-07-18 ENCOUNTER — LAB VISIT (OUTPATIENT)
Dept: LAB | Facility: HOSPITAL | Age: 57
End: 2022-07-18
Attending: NURSE PRACTITIONER
Payer: MEDICAID

## 2022-07-18 DIAGNOSIS — R31.29 MICROSCOPIC HEMATURIA: ICD-10-CM

## 2022-07-18 DIAGNOSIS — Z00.00 ROUTINE HEALTH MAINTENANCE: ICD-10-CM

## 2022-07-18 LAB
ALBUMIN SERPL BCP-MCNC: 4 G/DL (ref 3.5–5.2)
ALP SERPL-CCNC: 54 U/L (ref 55–135)
ALT SERPL W/O P-5'-P-CCNC: 10 U/L (ref 10–44)
ANION GAP SERPL CALC-SCNC: 6 MMOL/L (ref 8–16)
AST SERPL-CCNC: 21 U/L (ref 10–40)
BASOPHILS # BLD AUTO: 0.03 K/UL (ref 0–0.2)
BASOPHILS NFR BLD: 0.6 % (ref 0–1.9)
BILIRUB SERPL-MCNC: 0.9 MG/DL (ref 0.1–1)
BILIRUB UR QL STRIP: NEGATIVE
BUN SERPL-MCNC: 16 MG/DL (ref 6–20)
CALCIUM SERPL-MCNC: 8.5 MG/DL (ref 8.7–10.5)
CHLORIDE SERPL-SCNC: 103 MMOL/L (ref 95–110)
CHOLEST SERPL-MCNC: 161 MG/DL (ref 120–199)
CHOLEST/HDLC SERPL: 2.3 {RATIO} (ref 2–5)
CLARITY UR: CLEAR
CO2 SERPL-SCNC: 29 MMOL/L (ref 23–29)
COLOR UR: YELLOW
CREAT SERPL-MCNC: 0.6 MG/DL (ref 0.5–1.4)
DIFFERENTIAL METHOD: ABNORMAL
EOSINOPHIL # BLD AUTO: 0.1 K/UL (ref 0–0.5)
EOSINOPHIL NFR BLD: 0.9 % (ref 0–8)
ERYTHROCYTE [DISTWIDTH] IN BLOOD BY AUTOMATED COUNT: 11.8 % (ref 11.5–14.5)
EST. GFR  (AFRICAN AMERICAN): >60 ML/MIN/1.73 M^2
EST. GFR  (NON AFRICAN AMERICAN): >60 ML/MIN/1.73 M^2
GLUCOSE SERPL-MCNC: 96 MG/DL (ref 70–110)
GLUCOSE UR QL STRIP: NEGATIVE
HCT VFR BLD AUTO: 39.1 % (ref 37–48.5)
HDLC SERPL-MCNC: 71 MG/DL (ref 40–75)
HDLC SERPL: 44.1 % (ref 20–50)
HGB BLD-MCNC: 12.7 G/DL (ref 12–16)
HGB UR QL STRIP: ABNORMAL
IMM GRANULOCYTES # BLD AUTO: 0.02 K/UL (ref 0–0.04)
IMM GRANULOCYTES NFR BLD AUTO: 0.4 % (ref 0–0.5)
KETONES UR QL STRIP: NEGATIVE
LDLC SERPL CALC-MCNC: 73.2 MG/DL (ref 63–159)
LEUKOCYTE ESTERASE UR QL STRIP: NEGATIVE
LYMPHOCYTES # BLD AUTO: 2.6 K/UL (ref 1–4.8)
LYMPHOCYTES NFR BLD: 47.9 % (ref 18–48)
MCH RBC QN AUTO: 31.7 PG (ref 27–31)
MCHC RBC AUTO-ENTMCNC: 32.5 G/DL (ref 32–36)
MCV RBC AUTO: 98 FL (ref 82–98)
MONOCYTES # BLD AUTO: 0.4 K/UL (ref 0.3–1)
MONOCYTES NFR BLD: 7.8 % (ref 4–15)
NEUTROPHILS # BLD AUTO: 2.3 K/UL (ref 1.8–7.7)
NEUTROPHILS NFR BLD: 42.4 % (ref 38–73)
NITRITE UR QL STRIP: NEGATIVE
NONHDLC SERPL-MCNC: 90 MG/DL
NRBC BLD-RTO: 0 /100 WBC
PH UR STRIP: 6 [PH] (ref 5–8)
PLATELET # BLD AUTO: 219 K/UL (ref 150–450)
PMV BLD AUTO: 10.3 FL (ref 9.2–12.9)
POTASSIUM SERPL-SCNC: 3.6 MMOL/L (ref 3.5–5.1)
PROT SERPL-MCNC: 7.2 G/DL (ref 6–8.4)
PROT UR QL STRIP: NEGATIVE
RBC # BLD AUTO: 4.01 M/UL (ref 4–5.4)
SODIUM SERPL-SCNC: 138 MMOL/L (ref 136–145)
SP GR UR STRIP: >=1.03 (ref 1–1.03)
TRIGL SERPL-MCNC: 84 MG/DL (ref 30–150)
TSH SERPL DL<=0.005 MIU/L-ACNC: 1.41 UIU/ML (ref 0.34–5.6)
URN SPEC COLLECT METH UR: ABNORMAL
UROBILINOGEN UR STRIP-ACNC: NEGATIVE EU/DL
WBC # BLD AUTO: 5.41 K/UL (ref 3.9–12.7)

## 2022-07-18 PROCEDURE — 84443 ASSAY THYROID STIM HORMONE: CPT | Performed by: NURSE PRACTITIONER

## 2022-07-18 PROCEDURE — 85025 COMPLETE CBC W/AUTO DIFF WBC: CPT | Performed by: NURSE PRACTITIONER

## 2022-07-18 PROCEDURE — 36415 COLL VENOUS BLD VENIPUNCTURE: CPT | Performed by: NURSE PRACTITIONER

## 2022-07-18 PROCEDURE — 80061 LIPID PANEL: CPT | Performed by: NURSE PRACTITIONER

## 2022-07-18 PROCEDURE — 81003 URINALYSIS AUTO W/O SCOPE: CPT | Performed by: NURSE PRACTITIONER

## 2022-07-18 PROCEDURE — 80053 COMPREHEN METABOLIC PANEL: CPT | Performed by: NURSE PRACTITIONER

## 2022-07-25 ENCOUNTER — HOSPITAL ENCOUNTER (OUTPATIENT)
Dept: RADIOLOGY | Facility: HOSPITAL | Age: 57
Discharge: HOME | End: 2022-07-25
Attending: NURSE PRACTITIONER
Payer: MEDICAID

## 2022-07-25 DIAGNOSIS — R92.8 ABNORMAL MAMMOGRAM OF RIGHT BREAST: ICD-10-CM

## 2022-07-25 DIAGNOSIS — R92.8 ABNORMAL MAMMOGRAM: ICD-10-CM

## 2022-07-25 PROCEDURE — 77065 DX MAMMO INCL CAD UNI: CPT | Mod: TC,PO,RT

## 2023-02-03 RX ORDER — CHLORHEXIDINE GLUCONATE ORAL RINSE 1.2 MG/ML
15 SOLUTION DENTAL 2 TIMES DAILY
COMMUNITY
Start: 2022-09-06 | End: 2024-02-26 | Stop reason: ALTCHOICE

## 2023-02-03 RX ORDER — TRIAMCINOLONE ACETONIDE 1 MG/G
CREAM TOPICAL 2 TIMES DAILY
COMMUNITY
Start: 2022-12-27

## 2023-02-06 ENCOUNTER — OFFICE VISIT (OUTPATIENT)
Dept: FAMILY MEDICINE | Facility: CLINIC | Age: 58
End: 2023-02-06
Payer: MEDICAID

## 2023-02-06 VITALS
DIASTOLIC BLOOD PRESSURE: 62 MMHG | HEIGHT: 60 IN | WEIGHT: 106.81 LBS | RESPIRATION RATE: 20 BRPM | BODY MASS INDEX: 20.97 KG/M2 | SYSTOLIC BLOOD PRESSURE: 112 MMHG | TEMPERATURE: 98 F | HEART RATE: 80 BPM

## 2023-02-06 DIAGNOSIS — R31.29 MICROSCOPIC HEMATURIA: Primary | ICD-10-CM

## 2023-02-06 DIAGNOSIS — Z12.11 COLON CANCER SCREENING: ICD-10-CM

## 2023-02-06 DIAGNOSIS — F51.04 CHRONIC INSOMNIA: ICD-10-CM

## 2023-02-06 DIAGNOSIS — R79.89 LOW SERUM CALCIUM: ICD-10-CM

## 2023-02-06 PROCEDURE — 1159F PR MEDICATION LIST DOCUMENTED IN MEDICAL RECORD: ICD-10-PCS | Mod: CPTII,,, | Performed by: NURSE PRACTITIONER

## 2023-02-06 PROCEDURE — 1159F MED LIST DOCD IN RCRD: CPT | Mod: CPTII,,, | Performed by: NURSE PRACTITIONER

## 2023-02-06 PROCEDURE — 3008F PR BODY MASS INDEX (BMI) DOCUMENTED: ICD-10-PCS | Mod: CPTII,,, | Performed by: NURSE PRACTITIONER

## 2023-02-06 PROCEDURE — 3008F BODY MASS INDEX DOCD: CPT | Mod: CPTII,,, | Performed by: NURSE PRACTITIONER

## 2023-02-06 PROCEDURE — 3078F PR MOST RECENT DIASTOLIC BLOOD PRESSURE < 80 MM HG: ICD-10-PCS | Mod: CPTII,,, | Performed by: NURSE PRACTITIONER

## 2023-02-06 PROCEDURE — 3078F DIAST BP <80 MM HG: CPT | Mod: CPTII,,, | Performed by: NURSE PRACTITIONER

## 2023-02-06 PROCEDURE — 3074F SYST BP LT 130 MM HG: CPT | Mod: CPTII,,, | Performed by: NURSE PRACTITIONER

## 2023-02-06 PROCEDURE — 3074F PR MOST RECENT SYSTOLIC BLOOD PRESSURE < 130 MM HG: ICD-10-PCS | Mod: CPTII,,, | Performed by: NURSE PRACTITIONER

## 2023-02-06 PROCEDURE — 99214 OFFICE O/P EST MOD 30 MIN: CPT | Performed by: NURSE PRACTITIONER

## 2023-02-06 PROCEDURE — 99213 OFFICE O/P EST LOW 20 MIN: CPT | Mod: S$PBB,,, | Performed by: NURSE PRACTITIONER

## 2023-02-06 PROCEDURE — 99213 PR OFFICE/OUTPT VISIT, EST, LEVL III, 20-29 MIN: ICD-10-PCS | Mod: S$PBB,,, | Performed by: NURSE PRACTITIONER

## 2023-02-06 RX ORDER — TRAZODONE HYDROCHLORIDE 100 MG/1
100 TABLET ORAL NIGHTLY
Qty: 30 TABLET | Refills: 5 | Status: SHIPPED | OUTPATIENT
Start: 2023-02-06 | End: 2023-08-15

## 2023-02-06 RX ORDER — ZOLPIDEM TARTRATE 6.25 MG/1
6.25 TABLET, FILM COATED, EXTENDED RELEASE ORAL NIGHTLY
Qty: 7 TABLET | Refills: 0 | Status: SHIPPED | OUTPATIENT
Start: 2023-02-06 | End: 2023-03-15

## 2023-02-06 NOTE — PROGRESS NOTES
Patient ID: Tatyana Bello is a 57 y.o. female.    Chief Complaint: Follow-up    Ms. Bello presents today for medication refill. She states she is doing well overall at this time. She is requesting blood work be done and urinalysis to assess hematuria that was seen in her last urine sample. She denies any other issues or complaints at the time of this visit.     We reviewed overdue health maintenance.     Past Medical History:   Diagnosis Date    Insomnia     Wears contact lenses      Past Surgical History:   Procedure Laterality Date    BREAST BIOPSY Right     CARDIAC ELECTROPHYSIOLOGY STUDY AND ABLATION      DIAGNOSTIC LAPAROSCOPY N/A 12/19/2018    Procedure: LAPAROSCOPY, DIAGNOSTIC;  Surgeon: Christy Garcia MD;  Location: [facility];  Service: OB/GYN;  Laterality: N/A;         Tobacco History:  reports that she has quit smoking. She has never used smokeless tobacco.      Review of patient's allergies indicates:  No Known Allergies    Current Outpatient Medications:     chlorhexidine (PERIDEX) 0.12 % solution, 15 mLs 2 (two) times daily., Disp: , Rfl:     estrogen, conjugated,-medroxyprogesterone 0.3-1.5 mg (PREMPRO) 0.3-1.5 mg per tablet, Prempro 0.3 mg-1.5 mg tablet  Take 1 tablet every day by oral route., Disp: 90 tablet, Rfl: 3    triamcinolone acetonide 0.1% (KENALOG) 0.1 % cream, Apply topically 2 (two) times daily., Disp: , Rfl:     traZODone (DESYREL) 100 MG tablet, Take 1 tablet (100 mg total) by mouth every evening., Disp: 30 tablet, Rfl: 5    zolpidem (AMBIEN CR) 6.25 MG CR tablet, Take 1 tablet (6.25 mg total) by mouth every evening., Disp: 7 tablet, Rfl: 0    Review of Systems   Constitutional:  Negative for activity change, appetite change, fatigue, fever and unexpected weight change.   HENT:  Negative for congestion, mouth sores, nosebleeds, postnasal drip, rhinorrhea, sinus pressure, sinus pain, sneezing, sore throat and trouble swallowing.    Eyes:  Negative for pain, redness and itching.    Respiratory:  Negative for chest tightness and shortness of breath.    Cardiovascular:  Negative for chest pain and palpitations.   Gastrointestinal:  Negative for abdominal pain, blood in stool, constipation, diarrhea, nausea and vomiting.   Endocrine: Negative for cold intolerance, heat intolerance, polydipsia, polyphagia and polyuria.   Genitourinary:  Negative for difficulty urinating, dysuria, flank pain, frequency, genital sores, menstrual problem, urgency, vaginal bleeding and vaginal discharge.   Musculoskeletal:  Positive for arthralgias (mild arthralgias in right hand. Has not taken anything ). Negative for myalgias.   Skin:  Negative for rash and wound.   Allergic/Immunologic: Negative for environmental allergies and food allergies.   Neurological:  Negative for dizziness, light-headedness and headaches.   Hematological:  Negative for adenopathy. Does not bruise/bleed easily.   Psychiatric/Behavioral:  Positive for sleep disturbance (chronic insomnia. on medication ). Negative for agitation, confusion, hallucinations and self-injury. The patient is not nervous/anxious.         Objective:      Vitals:    02/06/23 0902   BP: 112/62   Pulse: 80   Resp: 20   Temp: 98 °F (36.7 °C)   TempSrc: Oral   Weight: 48.4 kg (106 lb 12.8 oz)   Height: 5' (1.524 m)     Physical Exam  Vitals reviewed.   Constitutional:       General: She is not in acute distress.     Appearance: Normal appearance. She is normal weight. She is not ill-appearing, toxic-appearing or diaphoretic.   HENT:      Head: Normocephalic and atraumatic.      Right Ear: Tympanic membrane and external ear normal. There is no impacted cerumen.      Left Ear: Tympanic membrane and external ear normal. There is no impacted cerumen.      Nose: Nose normal. No congestion or rhinorrhea.      Mouth/Throat:      Mouth: Mucous membranes are moist.      Pharynx: Oropharynx is clear. No oropharyngeal exudate or posterior oropharyngeal erythema.   Eyes:       General: No scleral icterus.        Right eye: No discharge.         Left eye: No discharge.      Extraocular Movements: Extraocular movements intact.      Conjunctiva/sclera: Conjunctivae normal.      Pupils: Pupils are equal, round, and reactive to light.   Neck:      Vascular: No carotid bruit.   Cardiovascular:      Rate and Rhythm: Normal rate and regular rhythm.      Pulses: Normal pulses.      Heart sounds: Normal heart sounds. No murmur heard.    No friction rub. No gallop.   Pulmonary:      Effort: Pulmonary effort is normal. No respiratory distress.      Breath sounds: Normal breath sounds. No stridor. No rales.   Chest:      Chest wall: No tenderness.   Abdominal:      General: Abdomen is flat. Bowel sounds are normal.      Palpations: Abdomen is soft. There is no mass.      Tenderness: There is no abdominal tenderness. There is no guarding.   Musculoskeletal:         General: No swelling or signs of injury. Normal range of motion.      Cervical back: Normal range of motion and neck supple. No rigidity or tenderness.      Right lower leg: No edema.      Left lower leg: No edema.   Skin:     General: Skin is warm and dry.      Capillary Refill: Capillary refill takes less than 2 seconds.      Findings: No bruising, lesion or rash.   Neurological:      General: No focal deficit present.      Mental Status: She is alert and oriented to person, place, and time. Mental status is at baseline.      Motor: No weakness.      Coordination: Coordination normal.   Psychiatric:         Mood and Affect: Mood normal.         Behavior: Behavior normal.         Thought Content: Thought content normal.         Judgment: Judgment normal.         Assessment:       1. Microscopic hematuria    2. Chronic insomnia    3. Low serum calcium    4. Colon cancer screening           Plan:       Microscopic hematuria  -     Urinalysis, Reflex to Urine Culture Urine, Clean Catch; Future; Expected date: 02/06/2023    Chronic insomnia  -      zolpidem (AMBIEN CR) 6.25 MG CR tablet; Take 1 tablet (6.25 mg total) by mouth every evening.  Dispense: 7 tablet; Refill: 0  -     traZODone (DESYREL) 100 MG tablet; Take 1 tablet (100 mg total) by mouth every evening.  Dispense: 30 tablet; Refill: 5    Low serum calcium  -     Basic Metabolic Panel; Future; Expected date: 02/06/2023    Colon cancer screening  -     Ambulatory referral/consult to Gastroenterology; Future; Expected date: 02/13/2023      Follow up in about 6 months (around 8/6/2023), or if symptoms worsen or fail to improve, for Insomnia/Labs .        2/6/2023 Funmilayo Soto, NP

## 2023-03-20 ENCOUNTER — OFFICE VISIT (OUTPATIENT)
Dept: OBSTETRICS AND GYNECOLOGY | Facility: CLINIC | Age: 58
End: 2023-03-20
Payer: MEDICAID

## 2023-03-20 VITALS
RESPIRATION RATE: 16 BRPM | DIASTOLIC BLOOD PRESSURE: 68 MMHG | WEIGHT: 108.69 LBS | HEIGHT: 60 IN | SYSTOLIC BLOOD PRESSURE: 112 MMHG | BODY MASS INDEX: 21.34 KG/M2

## 2023-03-20 DIAGNOSIS — Z12.31 ENCOUNTER FOR SCREENING MAMMOGRAM FOR MALIGNANT NEOPLASM OF BREAST: ICD-10-CM

## 2023-03-20 DIAGNOSIS — Z78.0 MENOPAUSE: ICD-10-CM

## 2023-03-20 DIAGNOSIS — Z01.419 WELL WOMAN EXAM WITH ROUTINE GYNECOLOGICAL EXAM: Primary | ICD-10-CM

## 2023-03-20 DIAGNOSIS — Z12.4 SCREENING FOR MALIGNANT NEOPLASM OF CERVIX: ICD-10-CM

## 2023-03-20 PROBLEM — N94.10 COITUS PAINFUL FOR FEMALE: Status: RESOLVED | Noted: 2018-12-19 | Resolved: 2023-03-20

## 2023-03-20 PROCEDURE — 3078F PR MOST RECENT DIASTOLIC BLOOD PRESSURE < 80 MM HG: ICD-10-PCS | Mod: CPTII,,, | Performed by: OBSTETRICS & GYNECOLOGY

## 2023-03-20 PROCEDURE — 99213 OFFICE O/P EST LOW 20 MIN: CPT | Mod: PBBFAC,PO | Performed by: OBSTETRICS & GYNECOLOGY

## 2023-03-20 PROCEDURE — 99396 PR PREVENTIVE VISIT,EST,40-64: ICD-10-PCS | Mod: S$PBB,,, | Performed by: OBSTETRICS & GYNECOLOGY

## 2023-03-20 PROCEDURE — 88141 CYTOPATH C/V INTERPRET: CPT | Mod: ,,, | Performed by: PATHOLOGY

## 2023-03-20 PROCEDURE — 1159F MED LIST DOCD IN RCRD: CPT | Mod: CPTII,,, | Performed by: OBSTETRICS & GYNECOLOGY

## 2023-03-20 PROCEDURE — 99999 PR PBB SHADOW E&M-EST. PATIENT-LVL III: ICD-10-PCS | Mod: PBBFAC,,, | Performed by: OBSTETRICS & GYNECOLOGY

## 2023-03-20 PROCEDURE — 99396 PREV VISIT EST AGE 40-64: CPT | Mod: S$PBB,,, | Performed by: OBSTETRICS & GYNECOLOGY

## 2023-03-20 PROCEDURE — 3074F SYST BP LT 130 MM HG: CPT | Mod: CPTII,,, | Performed by: OBSTETRICS & GYNECOLOGY

## 2023-03-20 PROCEDURE — 3008F BODY MASS INDEX DOCD: CPT | Mod: CPTII,,, | Performed by: OBSTETRICS & GYNECOLOGY

## 2023-03-20 PROCEDURE — 3074F PR MOST RECENT SYSTOLIC BLOOD PRESSURE < 130 MM HG: ICD-10-PCS | Mod: CPTII,,, | Performed by: OBSTETRICS & GYNECOLOGY

## 2023-03-20 PROCEDURE — 3008F PR BODY MASS INDEX (BMI) DOCUMENTED: ICD-10-PCS | Mod: CPTII,,, | Performed by: OBSTETRICS & GYNECOLOGY

## 2023-03-20 PROCEDURE — 3078F DIAST BP <80 MM HG: CPT | Mod: CPTII,,, | Performed by: OBSTETRICS & GYNECOLOGY

## 2023-03-20 PROCEDURE — 87624 HPV HI-RISK TYP POOLED RSLT: CPT | Performed by: OBSTETRICS & GYNECOLOGY

## 2023-03-20 PROCEDURE — 99999 PR PBB SHADOW E&M-EST. PATIENT-LVL III: CPT | Mod: PBBFAC,,, | Performed by: OBSTETRICS & GYNECOLOGY

## 2023-03-20 PROCEDURE — 1159F PR MEDICATION LIST DOCUMENTED IN MEDICAL RECORD: ICD-10-PCS | Mod: CPTII,,, | Performed by: OBSTETRICS & GYNECOLOGY

## 2023-03-20 PROCEDURE — 88141 PR  CYTOPATH CERV/VAG INTERPRET: ICD-10-PCS | Mod: ,,, | Performed by: PATHOLOGY

## 2023-03-20 PROCEDURE — 88175 CYTOPATH C/V AUTO FLUID REDO: CPT | Performed by: PATHOLOGY

## 2023-03-20 NOTE — PROGRESS NOTES
Chief Complaint   Patient presents with    Annual Exam       History and Physical:  No LMP recorded (lmp unknown). Patient is perimenopausal.    HRT: Prempro    Tatyana Bello is a 57 y.o.  who presents today for her routine annual GYN exam.   The patient has no Gynecology complaints today.  She is currently doing well on her current hormone replacement therapy and was without issues.  She is scheduled for colonoscopy in the near future.    Allergies: Review of patient's allergies indicates:  No Known Allergies    Past Medical History:   Diagnosis Date    Insomnia     Wears contact lenses        Past Surgical History:   Procedure Laterality Date    BREAST BIOPSY Right     CARDIAC ELECTROPHYSIOLOGY STUDY AND ABLATION      DIAGNOSTIC LAPAROSCOPY N/A 2018    Procedure: LAPAROSCOPY, DIAGNOSTIC;  Surgeon: Christy Garcia MD;  Location: [facility];  Service: OB/GYN;  Laterality: N/A;       MEDS:   Current Outpatient Medications:     chlorhexidine (PERIDEX) 0.12 % solution, 15 mLs 2 (two) times daily., Disp: , Rfl:     traZODone (DESYREL) 100 MG tablet, Take 1 tablet (100 mg total) by mouth every evening., Disp: 30 tablet, Rfl: 5    triamcinolone acetonide 0.1% (KENALOG) 0.1 % cream, Apply topically 2 (two) times daily., Disp: , Rfl:     zolpidem (AMBIEN CR) 6.25 MG CR tablet, Take 1 tablet by mouth in the evening, Disp: 7 tablet, Rfl: 0    estrogen, conjugated,-medroxyprogesterone 0.3-1.5 mg (PREMPRO) 0.3-1.5 mg per tablet, Prempro 0.3 mg-1.5 mg tablet  Take 1 tablet every day by oral route., Disp: 90 tablet, Rfl: 3     OB History          2    Para   1    Term   1            AB   1    Living   1         SAB   1    IAB        Ectopic        Multiple        Live Births               Obstetric Comments   Vaginal delivery x1 and SAB x1               Social History     Socioeconomic History    Marital status:     Number of children: 1   Tobacco Use    Smoking status: Former    Smokeless  tobacco: Never   Substance and Sexual Activity    Alcohol use: Yes    Drug use: No    Sexual activity: Yes     Partners: Male       Family History   Problem Relation Age of Onset    Stroke Father     Breast cancer Sister     Bone cancer Sister     Colon cancer Brother     Diabetes Mother     Hypertension Mother     Stroke Maternal Grandmother     Stroke Maternal Grandfather          Past medical and surgical history reviewed.   I have reviewed the patient's medical history in detail and updated the computerized patient record.      Review of Systems (at today's evaluation)  Review of Systems   Constitutional:  Negative for fever and unexpected weight change.   HENT:  Negative for congestion, ear pain, nosebleeds, sinus pain and sore throat.    Eyes: Negative.    Respiratory:  Negative for cough and shortness of breath.    Cardiovascular:  Negative for chest pain and palpitations.   Gastrointestinal:  Negative for constipation, diarrhea, nausea and vomiting.   Endocrine: Negative.    Genitourinary:  Negative for dysuria, frequency, hematuria and urgency.        Gyn as per HPI   Musculoskeletal:  Negative for arthralgias and myalgias.   Skin:  Negative for rash.   Neurological:  Negative for weakness and headaches.   Psychiatric/Behavioral:  The patient is not nervous/anxious.       Physical Exam:   /68 (BP Location: Right arm, Patient Position: Sitting, BP Method: Medium (Manual))   Resp 16   Ht 5' (1.524 m)   Wt 49.3 kg (108 lb 11 oz)   LMP  (LMP Unknown)   BMI 21.23 kg/m²       Physical Exam:   Constitutional: She appears well-developed and well-nourished.    HENT:   Head: Normocephalic.     Neck: No thyroid mass present.    Cardiovascular:  Normal rate, regular rhythm and normal heart sounds.             Pulmonary/Chest: Effort normal and breath sounds normal. Right breast exhibits no mass, no nipple discharge and no skin change. Left breast exhibits no mass, no nipple discharge and no skin change.         Abdominal: Soft. There is no abdominal tenderness.     Genitourinary:    Inguinal canal, vagina, uterus, right adnexa and left adnexa normal.      Pelvic exam was performed with patient supine.   The external female genitalia was normal.   No external genitalia lesions identified,Cervix is normal. Right adnexum displays no mass and no tenderness. Left adnexum displays no mass and no tenderness. No tenderness or bleeding in the vagina. Uterus is not tender. Normal urethral meatus.Urethra findings: no tendernessBladder findings: no bladder tenderness          Musculoskeletal:      Right lower leg: No edema.      Left lower leg: No edema.      Lymphadenopathy:     She has no cervical adenopathy. No inguinal adenopathy noted on the right or left side.    Neurological: She is alert.   No gross defects noted    Skin: Skin is warm and dry.    Psychiatric: Mood normal.        Assessment:        1. Well woman exam with routine gynecological exam    2. Screening for malignant neoplasm of cervix    3. Menopause    4. Encounter for screening mammogram for malignant neoplasm of breast         Plan:      Well woman exam with routine gynecological exam    Screening for malignant neoplasm of cervix  -     HPV High Risk Genotypes, PCR  -     Liquid-Based Pap Smear, Screening    Menopause  -     estrogen, conjugated,-medroxyprogesterone 0.3-1.5 mg (PREMPRO) 0.3-1.5 mg per tablet; Prempro 0.3 mg-1.5 mg tablet  Take 1 tablet every day by oral route.  Dispense: 90 tablet; Refill: 3    Encounter for screening mammogram for malignant neoplasm of breast  -     Mammo Digital Screening Bilat; Future; Expected date: 03/20/2023       Follow up for as needed or for annual exam.     The above was reviewed and discussed with the patient.    Annual exam and screening issues based on the patient's age, medical history and family history were reviewed / discussed.    The patient's current HRT was discussed.  A refill was provided.  The pros,  cons, risks, benefits, alternatives and indications of the medication(s) prescribed, as well as appropriate use and potential side effects were discussed.    The patient's questions were answered, and she is in agreement with the current plan.

## 2023-03-27 LAB
HPV HR 12 DNA SPEC QL NAA+PROBE: NEGATIVE
HPV16 AG SPEC QL: NEGATIVE
HPV18 DNA SPEC QL NAA+PROBE: NEGATIVE

## 2023-03-28 LAB
FINAL PATHOLOGIC DIAGNOSIS: ABNORMAL
Lab: ABNORMAL

## 2023-05-11 ENCOUNTER — TELEPHONE (OUTPATIENT)
Dept: OBSTETRICS AND GYNECOLOGY | Facility: CLINIC | Age: 58
End: 2023-05-11
Payer: MEDICAID

## 2023-05-11 NOTE — TELEPHONE ENCOUNTER
Notified pt the next available with Dr. Salmon is 5/22 at 3 pm. Informed her she will be placed on the waitlist, but if she feels she needs to be seen sooner, she can go to urgent care. Pt voiced understanding.

## 2023-05-11 NOTE — TELEPHONE ENCOUNTER
----- Message from Thelma Morgan sent at 5/11/2023  3:28 PM CDT -----  Regarding: advice  Contact: PAtient  Type: Needs Medical Advice  Who Called:  Patient   Symptoms (please be specific):    How long has patient had these symptoms:    Pharmacy name and phone #:    Best Call Back Number: [phone]    Additional Information: Patient stated that she believes to have a cyst on her vagina. Patient wanted to be seen. Please contact patient to advise. Thanks

## 2023-06-08 DIAGNOSIS — F51.04 CHRONIC INSOMNIA: ICD-10-CM

## 2023-06-09 RX ORDER — ZOLPIDEM TARTRATE 6.25 MG/1
6.25 TABLET, FILM COATED, EXTENDED RELEASE ORAL NIGHTLY
Qty: 7 TABLET | Refills: 0 | Status: SHIPPED | OUTPATIENT
Start: 2023-06-09 | End: 2023-07-25 | Stop reason: SDUPTHER

## 2023-06-26 ENCOUNTER — OFFICE VISIT (OUTPATIENT)
Dept: OBSTETRICS AND GYNECOLOGY | Facility: CLINIC | Age: 58
End: 2023-06-26
Payer: MEDICAID

## 2023-06-26 VITALS
DIASTOLIC BLOOD PRESSURE: 62 MMHG | HEIGHT: 60 IN | WEIGHT: 105.63 LBS | RESPIRATION RATE: 16 BRPM | SYSTOLIC BLOOD PRESSURE: 132 MMHG | BODY MASS INDEX: 20.74 KG/M2

## 2023-06-26 DIAGNOSIS — R30.0 DYSURIA: ICD-10-CM

## 2023-06-26 DIAGNOSIS — N89.8 SKIN TAG OF VAGINAL MUCOSA: Primary | ICD-10-CM

## 2023-06-26 PROCEDURE — 87077 CULTURE AEROBIC IDENTIFY: CPT | Performed by: OBSTETRICS & GYNECOLOGY

## 2023-06-26 PROCEDURE — 87186 SC STD MICRODIL/AGAR DIL: CPT | Performed by: OBSTETRICS & GYNECOLOGY

## 2023-06-26 PROCEDURE — 3008F PR BODY MASS INDEX (BMI) DOCUMENTED: ICD-10-PCS | Mod: CPTII,,, | Performed by: OBSTETRICS & GYNECOLOGY

## 2023-06-26 PROCEDURE — 3008F BODY MASS INDEX DOCD: CPT | Mod: CPTII,,, | Performed by: OBSTETRICS & GYNECOLOGY

## 2023-06-26 PROCEDURE — 99214 PR OFFICE/OUTPT VISIT, EST, LEVL IV, 30-39 MIN: ICD-10-PCS | Mod: S$PBB,,, | Performed by: OBSTETRICS & GYNECOLOGY

## 2023-06-26 PROCEDURE — 99999 PR PBB SHADOW E&M-EST. PATIENT-LVL III: CPT | Mod: PBBFAC,,, | Performed by: OBSTETRICS & GYNECOLOGY

## 2023-06-26 PROCEDURE — 99213 OFFICE O/P EST LOW 20 MIN: CPT | Mod: PBBFAC,PO | Performed by: OBSTETRICS & GYNECOLOGY

## 2023-06-26 PROCEDURE — 99214 OFFICE O/P EST MOD 30 MIN: CPT | Mod: S$PBB,,, | Performed by: OBSTETRICS & GYNECOLOGY

## 2023-06-26 PROCEDURE — 1159F MED LIST DOCD IN RCRD: CPT | Mod: CPTII,,, | Performed by: OBSTETRICS & GYNECOLOGY

## 2023-06-26 PROCEDURE — 87086 URINE CULTURE/COLONY COUNT: CPT | Performed by: OBSTETRICS & GYNECOLOGY

## 2023-06-26 PROCEDURE — 1159F PR MEDICATION LIST DOCUMENTED IN MEDICAL RECORD: ICD-10-PCS | Mod: CPTII,,, | Performed by: OBSTETRICS & GYNECOLOGY

## 2023-06-26 PROCEDURE — 99999 PR PBB SHADOW E&M-EST. PATIENT-LVL III: ICD-10-PCS | Mod: PBBFAC,,, | Performed by: OBSTETRICS & GYNECOLOGY

## 2023-06-26 PROCEDURE — 3075F SYST BP GE 130 - 139MM HG: CPT | Mod: CPTII,,, | Performed by: OBSTETRICS & GYNECOLOGY

## 2023-06-26 PROCEDURE — 3078F DIAST BP <80 MM HG: CPT | Mod: CPTII,,, | Performed by: OBSTETRICS & GYNECOLOGY

## 2023-06-26 PROCEDURE — 3075F PR MOST RECENT SYSTOLIC BLOOD PRESS GE 130-139MM HG: ICD-10-PCS | Mod: CPTII,,, | Performed by: OBSTETRICS & GYNECOLOGY

## 2023-06-26 PROCEDURE — 87088 URINE BACTERIA CULTURE: CPT | Performed by: OBSTETRICS & GYNECOLOGY

## 2023-06-26 PROCEDURE — 3078F PR MOST RECENT DIASTOLIC BLOOD PRESSURE < 80 MM HG: ICD-10-PCS | Mod: CPTII,,, | Performed by: OBSTETRICS & GYNECOLOGY

## 2023-06-26 NOTE — PROGRESS NOTES
Chief Complaint   Patient presents with    Cyst       History and Physical:  No LMP recorded. Patient is perimenopausal.    HRT: Prempro    Tatyana Bello is a 58 y.o.      Date:  2023     The patient presents today due to a swelling on the left lateral portion of the vagina.  This was significant a few weeks ago but is now gotten smaller.  She reports pain with intercourse in this area.    In addition she is been having some dysuria.    No deep pelvic pain or postmenopausal bleeding.    Allergies: Review of patient's allergies indicates:  No Known Allergies    Past Medical History:   Diagnosis Date    Insomnia     Wears contact lenses        Past Surgical History:   Procedure Laterality Date    BREAST BIOPSY Right     CARDIAC ELECTROPHYSIOLOGY STUDY AND ABLATION      DIAGNOSTIC LAPAROSCOPY N/A 2018    Procedure: LAPAROSCOPY, DIAGNOSTIC;  Surgeon: Christy Garcia MD;  Location: [facility];  Service: OB/GYN;  Laterality: N/A;       MEDS:   Current Outpatient Medications:     chlorhexidine (PERIDEX) 0.12 % solution, 15 mLs 2 (two) times daily., Disp: , Rfl:     estrogen, conjugated,-medroxyprogesterone 0.3-1.5 mg (PREMPRO) 0.3-1.5 mg per tablet, Prempro 0.3 mg-1.5 mg tablet  Take 1 tablet every day by oral route., Disp: 90 tablet, Rfl: 3    traZODone (DESYREL) 100 MG tablet, Take 1 tablet (100 mg total) by mouth every evening., Disp: 30 tablet, Rfl: 5    triamcinolone acetonide 0.1% (KENALOG) 0.1 % cream, Apply topically 2 (two) times daily., Disp: , Rfl:     zolpidem (AMBIEN CR) 6.25 MG CR tablet, Take 1 tablet (6.25 mg total) by mouth every evening., Disp: 7 tablet, Rfl: 0     OB History          2    Para   1    Term   1            AB   1    Living   1         SAB   1    IAB        Ectopic        Multiple        Live Births               Obstetric Comments   Vaginal delivery x1 and SAB x1               Social History     Socioeconomic History    Marital status:      Number of children: 1   Tobacco Use    Smoking status: Former    Smokeless tobacco: Never   Substance and Sexual Activity    Alcohol use: Yes    Drug use: No    Sexual activity: Yes     Partners: Male       Family History   Problem Relation Age of Onset    Stroke Father     Breast cancer Sister     Bone cancer Sister     Colon cancer Brother     Diabetes Mother     Hypertension Mother     Stroke Maternal Grandmother     Stroke Maternal Grandfather          Past medical and surgical history reviewed.   I have reviewed the patient's medical history in detail and updated the computerized patient record.      Review of Systems (at today's evaluation)  Review of Systems   Constitutional:  Negative for fever and unexpected weight change.   Respiratory:  Negative for cough and shortness of breath.    Cardiovascular:  Negative for chest pain.   Gastrointestinal:  Negative for constipation, diarrhea and nausea.   Genitourinary:  Positive for dysuria. Negative for frequency.          GYN AS PER HPI   Musculoskeletal: Negative.    Skin: Negative.    Neurological: Negative.       Physical Exam:   /62 (BP Location: Right arm, Patient Position: Sitting, BP Method: Medium (Manual))   Resp 16   Ht 5' (1.524 m)   Wt 47.9 kg (105 lb 9.6 oz)   BMI 20.62 kg/m²       Physical Exam:   Constitutional: She appears well-developed and well-nourished.    HENT:   Head: Normocephalic.     Neck: No thyroid mass present.    Cardiovascular:  Normal rate, regular rhythm and normal heart sounds.             Pulmonary/Chest: Effort normal and breath sounds normal.        Abdominal: Soft. There is no abdominal tenderness.     Genitourinary:    Inguinal canal, uterus, right adnexa and left adnexa normal.            Pelvic exam was performed with patient supine.   The external female genitalia was normal.   No external genitalia lesions identified,Cervix is normal. Right adnexum displays no mass and no tenderness. Left adnexum displays no mass  and no tenderness. Vagina exhibits lesion. No tenderness or bleeding in the vagina. Uterus is not tender. Normal urethral meatus.Urethra findings: no tendernessBladder findings: no bladder tenderness          Musculoskeletal:      Right lower leg: No edema.      Left lower leg: No edema.      Lymphadenopathy:     She has no cervical adenopathy. No inguinal adenopathy noted on the right or left side.    Neurological: She is alert.    Skin: Skin is warm and dry.    Psychiatric: Mood normal.        Assessment:        1. Skin tag of vaginal mucosa    2. Dysuria      Plan:      Skin tag of vaginal mucosa    Dysuria  -     Urine culture       Follow up in about 2 weeks (around 7/10/2023) for Follow-up on today's evaluation.     The above was reviewed and discussed with the patient.    We discussed her history and findings on physical exam.    We discussed the fact that at this time no significant abnormalities were noted with the exception of the vaginal rugae in the area of concern.  It is possible there is a previous abscess which has now open and resolved.    At this time will proceed as follows:  1. Urine will be sent for culture and sensitivity  2. The patient will monitor the area over the next few weeks.  If it continues to be uncomfortable if she notes any changes we discussed potentially excising the vaginal rugae.  Will otherwise continue with conservative management     The patient's questions regarding the above were answered and she is in agreement with the current plan.

## 2023-06-29 DIAGNOSIS — R30.0 DYSURIA: Primary | ICD-10-CM

## 2023-06-29 LAB — BACTERIA UR CULT: ABNORMAL

## 2023-06-29 RX ORDER — NITROFURANTOIN (MACROCRYSTALS) 100 MG/1
100 CAPSULE ORAL EVERY 12 HOURS
Qty: 10 CAPSULE | Refills: 0 | Status: SHIPPED | OUTPATIENT
Start: 2023-06-29 | End: 2023-07-04

## 2023-07-05 ENCOUNTER — TELEPHONE (OUTPATIENT)
Dept: OBSTETRICS AND GYNECOLOGY | Facility: CLINIC | Age: 58
End: 2023-07-05
Payer: MEDICAID

## 2023-07-05 NOTE — TELEPHONE ENCOUNTER
----- Message from Omar Salmon MD sent at 7/5/2023  9:00 AM CDT -----  Regarding: UTI  Notify this patient of recent findings compatible with UTI and let her know I have sent a prescription for Macrobid to her pharmacy.

## 2023-07-18 ENCOUNTER — LAB VISIT (OUTPATIENT)
Dept: LAB | Facility: HOSPITAL | Age: 58
End: 2023-07-18
Attending: NURSE PRACTITIONER
Payer: MEDICAID

## 2023-07-18 DIAGNOSIS — R31.29 MICROSCOPIC HEMATURIA: ICD-10-CM

## 2023-07-18 DIAGNOSIS — R79.89 LOW SERUM CALCIUM: ICD-10-CM

## 2023-07-18 LAB
ANION GAP SERPL CALC-SCNC: 8 MMOL/L (ref 8–16)
BACTERIA #/AREA URNS HPF: NEGATIVE /HPF
BILIRUB UR QL STRIP: NEGATIVE
BUN SERPL-MCNC: 14 MG/DL (ref 6–20)
CALCIUM SERPL-MCNC: 9 MG/DL (ref 8.7–10.5)
CHLORIDE SERPL-SCNC: 108 MMOL/L (ref 95–110)
CLARITY UR: CLEAR
CO2 SERPL-SCNC: 23 MMOL/L (ref 23–29)
COLOR UR: YELLOW
CREAT SERPL-MCNC: 0.7 MG/DL (ref 0.5–1.4)
EST. GFR  (NO RACE VARIABLE): >60 ML/MIN/1.73 M^2
GLUCOSE SERPL-MCNC: 106 MG/DL (ref 70–110)
GLUCOSE UR QL STRIP: NEGATIVE
HGB UR QL STRIP: ABNORMAL
HYALINE CASTS #/AREA URNS LPF: 8 /LPF
KETONES UR QL STRIP: NEGATIVE
LEUKOCYTE ESTERASE UR QL STRIP: NEGATIVE
MICROSCOPIC COMMENT: ABNORMAL
NITRITE UR QL STRIP: NEGATIVE
PH UR STRIP: 6 [PH] (ref 5–8)
POTASSIUM SERPL-SCNC: 4 MMOL/L (ref 3.5–5.1)
PROT UR QL STRIP: NEGATIVE
RBC #/AREA URNS HPF: 1 /HPF (ref 0–4)
SODIUM SERPL-SCNC: 139 MMOL/L (ref 136–145)
SP GR UR STRIP: 1.02 (ref 1–1.03)
SQUAMOUS #/AREA URNS HPF: 2 /HPF
URN SPEC COLLECT METH UR: ABNORMAL
UROBILINOGEN UR STRIP-ACNC: NEGATIVE EU/DL
WBC #/AREA URNS HPF: 2 /HPF (ref 0–5)

## 2023-07-18 PROCEDURE — 36415 COLL VENOUS BLD VENIPUNCTURE: CPT | Performed by: NURSE PRACTITIONER

## 2023-07-18 PROCEDURE — 81001 URINALYSIS AUTO W/SCOPE: CPT | Performed by: NURSE PRACTITIONER

## 2023-07-18 PROCEDURE — 80048 BASIC METABOLIC PNL TOTAL CA: CPT | Performed by: NURSE PRACTITIONER

## 2023-07-25 DIAGNOSIS — F51.04 CHRONIC INSOMNIA: ICD-10-CM

## 2023-07-26 RX ORDER — ZOLPIDEM TARTRATE 6.25 MG/1
6.25 TABLET, FILM COATED, EXTENDED RELEASE ORAL NIGHTLY
Qty: 7 TABLET | Refills: 0 | Status: SHIPPED | OUTPATIENT
Start: 2023-07-26 | End: 2023-08-07 | Stop reason: SDUPTHER

## 2023-08-07 ENCOUNTER — OFFICE VISIT (OUTPATIENT)
Dept: FAMILY MEDICINE | Facility: CLINIC | Age: 58
End: 2023-08-07
Payer: MEDICAID

## 2023-08-07 VITALS
SYSTOLIC BLOOD PRESSURE: 110 MMHG | HEIGHT: 60 IN | TEMPERATURE: 98 F | WEIGHT: 107 LBS | RESPIRATION RATE: 20 BRPM | DIASTOLIC BLOOD PRESSURE: 70 MMHG | BODY MASS INDEX: 21.01 KG/M2 | HEART RATE: 68 BPM

## 2023-08-07 DIAGNOSIS — R31.29 MICROSCOPIC HEMATURIA: Primary | ICD-10-CM

## 2023-08-07 DIAGNOSIS — F51.04 CHRONIC INSOMNIA: ICD-10-CM

## 2023-08-07 PROCEDURE — 1159F MED LIST DOCD IN RCRD: CPT | Mod: CPTII,,, | Performed by: NURSE PRACTITIONER

## 2023-08-07 PROCEDURE — 3078F DIAST BP <80 MM HG: CPT | Mod: CPTII,,, | Performed by: NURSE PRACTITIONER

## 2023-08-07 PROCEDURE — 3074F SYST BP LT 130 MM HG: CPT | Mod: CPTII,,, | Performed by: NURSE PRACTITIONER

## 2023-08-07 PROCEDURE — 99213 OFFICE O/P EST LOW 20 MIN: CPT | Mod: S$PBB,,, | Performed by: NURSE PRACTITIONER

## 2023-08-07 PROCEDURE — 99214 OFFICE O/P EST MOD 30 MIN: CPT | Performed by: NURSE PRACTITIONER

## 2023-08-07 PROCEDURE — 3078F PR MOST RECENT DIASTOLIC BLOOD PRESSURE < 80 MM HG: ICD-10-PCS | Mod: CPTII,,, | Performed by: NURSE PRACTITIONER

## 2023-08-07 PROCEDURE — 1159F PR MEDICATION LIST DOCUMENTED IN MEDICAL RECORD: ICD-10-PCS | Mod: CPTII,,, | Performed by: NURSE PRACTITIONER

## 2023-08-07 PROCEDURE — 3074F PR MOST RECENT SYSTOLIC BLOOD PRESSURE < 130 MM HG: ICD-10-PCS | Mod: CPTII,,, | Performed by: NURSE PRACTITIONER

## 2023-08-07 PROCEDURE — 3008F PR BODY MASS INDEX (BMI) DOCUMENTED: ICD-10-PCS | Mod: CPTII,,, | Performed by: NURSE PRACTITIONER

## 2023-08-07 PROCEDURE — 3008F BODY MASS INDEX DOCD: CPT | Mod: CPTII,,, | Performed by: NURSE PRACTITIONER

## 2023-08-07 PROCEDURE — 99213 PR OFFICE/OUTPT VISIT, EST, LEVL III, 20-29 MIN: ICD-10-PCS | Mod: S$PBB,,, | Performed by: NURSE PRACTITIONER

## 2023-08-07 RX ORDER — ZOLPIDEM TARTRATE 5 MG/1
5 TABLET ORAL NIGHTLY
Qty: 30 TABLET | Refills: 1 | Status: SHIPPED | OUTPATIENT
Start: 2023-08-07 | End: 2024-02-02 | Stop reason: SDUPTHER

## 2023-08-07 RX ORDER — TRETINOIN 1 MG/G
CREAM TOPICAL
COMMUNITY
Start: 2023-04-29

## 2023-08-07 RX ORDER — TEMAZEPAM 30 MG/1
CAPSULE ORAL
COMMUNITY
End: 2023-08-07 | Stop reason: ALTCHOICE

## 2023-08-07 NOTE — PROGRESS NOTES
Patient ID: Tatyana Bello is a 58 y.o. female.    Chief Complaint: Follow-up (57 yo female here for 6 month recheck. Pt states no c/o. KM)    Ms. Bello  presents today for 6 month follow up and medication refill. She states she Is doing fine at the time of this visit and she denies any other issues or complaints.    She states she also has been having a urinary tract infection for several weeks and was treated with Macrobid. She would like culture to see if it is cleared. Also, she has been having microscopic hematuria for over 5 years and she is concerned and requesting referral to specialist for further evaluation.     We reviewed overdue health maintenance.     Follow-up  Pertinent negatives include no abdominal pain, arthralgias, chest pain, congestion, fatigue, fever, headaches, joint swelling, myalgias, nausea, neck pain, rash, sore throat, vomiting or weakness.           Past Medical History:   Diagnosis Date    Insomnia     Wears contact lenses      Past Surgical History:   Procedure Laterality Date    BREAST BIOPSY Right     CARDIAC ELECTROPHYSIOLOGY STUDY AND ABLATION      DIAGNOSTIC LAPAROSCOPY N/A 12/19/2018    Procedure: LAPAROSCOPY, DIAGNOSTIC;  Surgeon: Christy Garcia MD;  Location: [facility];  Service: OB/GYN;  Laterality: N/A;         Tobacco History:  reports that she has quit smoking. She has never used smokeless tobacco.      Review of patient's allergies indicates:  No Known Allergies    Current Outpatient Medications:     chlorhexidine (PERIDEX) 0.12 % solution, 15 mLs 2 (two) times daily., Disp: , Rfl:     estrogen, conjugated,-medroxyprogesterone 0.3-1.5 mg (PREMPRO) 0.3-1.5 mg per tablet, Prempro 0.3 mg-1.5 mg tablet  Take 1 tablet every day by oral route., Disp: 90 tablet, Rfl: 3    RETIN-A 0.1 % cream, SMARTSIG:sparingly Topical Every Night, Disp: , Rfl:     traZODone (DESYREL) 100 MG tablet, Take 1 tablet (100 mg total) by mouth every evening., Disp: 30 tablet, Rfl: 5     triamcinolone acetonide 0.1% (KENALOG) 0.1 % cream, Apply topically 2 (two) times daily., Disp: , Rfl:     zolpidem (AMBIEN) 5 MG Tab, Take 1 tablet (5 mg total) by mouth every evening., Disp: 30 tablet, Rfl: 1    Review of Systems   Constitutional:  Negative for activity change, appetite change, fatigue, fever and unexpected weight change.   HENT:  Negative for congestion, hearing loss, mouth sores, nosebleeds, postnasal drip, rhinorrhea, sinus pressure, sinus pain, sneezing, sore throat and trouble swallowing.    Eyes:  Negative for pain, discharge, redness, itching and visual disturbance.   Respiratory:  Negative for chest tightness, shortness of breath and wheezing.    Cardiovascular:  Negative for chest pain and palpitations.   Gastrointestinal:  Negative for abdominal pain, blood in stool, constipation, diarrhea, nausea and vomiting.   Endocrine: Negative for cold intolerance, heat intolerance, polydipsia, polyphagia and polyuria.   Genitourinary:  Negative for difficulty urinating, dysuria, flank pain, frequency, genital sores, hematuria, menstrual problem, urgency, vaginal bleeding and vaginal discharge.   Musculoskeletal:  Negative for arthralgias, joint swelling, myalgias and neck pain.   Skin:  Negative for rash and wound.   Allergic/Immunologic: Negative for environmental allergies and food allergies.   Neurological:  Negative for dizziness, weakness, light-headedness and headaches.   Hematological:  Negative for adenopathy. Does not bruise/bleed easily.   Psychiatric/Behavioral:  Negative for agitation, confusion, dysphoric mood, hallucinations, self-injury and sleep disturbance. The patient is not nervous/anxious.           Objective:      Vitals:    08/07/23 0954   BP: 110/70   Pulse: 68   Resp: 20   Temp: 98.1 °F (36.7 °C)   TempSrc: Oral   Weight: 48.5 kg (107 lb)   Height: 5' (1.524 m)     Physical Exam  Vitals reviewed.   Constitutional:       General: She is not in acute distress.     Appearance:  Normal appearance. She is normal weight. She is not ill-appearing, toxic-appearing or diaphoretic.   HENT:      Head: Normocephalic and atraumatic.      Right Ear: Tympanic membrane and external ear normal. There is no impacted cerumen.      Left Ear: Tympanic membrane and external ear normal. There is no impacted cerumen.      Nose: Nose normal. No congestion or rhinorrhea.      Mouth/Throat:      Mouth: Mucous membranes are moist.      Pharynx: Oropharynx is clear. No oropharyngeal exudate or posterior oropharyngeal erythema.   Eyes:      General: No scleral icterus.        Right eye: No discharge.         Left eye: No discharge.      Extraocular Movements: Extraocular movements intact.      Conjunctiva/sclera: Conjunctivae normal.      Pupils: Pupils are equal, round, and reactive to light.   Neck:      Vascular: No carotid bruit.   Cardiovascular:      Rate and Rhythm: Normal rate and regular rhythm.      Pulses: Normal pulses.      Heart sounds: Normal heart sounds. No murmur heard.     No friction rub. No gallop.   Pulmonary:      Effort: Pulmonary effort is normal. No respiratory distress.      Breath sounds: Normal breath sounds. No stridor. No rales.   Chest:      Chest wall: No tenderness.   Abdominal:      General: Abdomen is flat. Bowel sounds are normal.      Palpations: Abdomen is soft. There is no mass.      Tenderness: There is no abdominal tenderness. There is no guarding.   Musculoskeletal:         General: No swelling or signs of injury. Normal range of motion.      Cervical back: Normal range of motion and neck supple. No rigidity or tenderness.      Right lower leg: No edema.      Left lower leg: No edema.   Skin:     General: Skin is warm and dry.      Capillary Refill: Capillary refill takes less than 2 seconds.      Findings: No bruising, lesion or rash.   Neurological:      General: No focal deficit present.      Mental Status: She is alert and oriented to person, place, and time. Mental  status is at baseline.      Motor: No weakness.      Coordination: Coordination normal.   Psychiatric:         Mood and Affect: Mood normal.         Behavior: Behavior normal.         Thought Content: Thought content normal.         Judgment: Judgment normal.           Assessment:       1. Microscopic hematuria    2. Chronic insomnia           Plan:       Microscopic hematuria  -     Urine culture  -     Ambulatory referral/consult to Urology; Future; Expected date: 08/14/2023    Chronic insomnia  -     zolpidem (AMBIEN) 5 MG Tab; Take 1 tablet (5 mg total) by mouth every evening.  Dispense: 30 tablet; Refill: 1      Follow up in about 6 months (around 2/7/2024), or if symptoms worsen or fail to improve, for Insomnia/Hematuria.        8/7/2023 Funmilayo Soto NP      Answers submitted by the patient for this visit:  Review of Systems Questionnaire (Submitted on 8/6/2023)  activity change: No  unexpected weight change: No  neck pain: No  hearing loss: No  rhinorrhea: No  trouble swallowing: No  eye discharge: No  visual disturbance: No  chest tightness: No  wheezing: No  chest pain: No  palpitations: No  blood in stool: No  constipation: No  vomiting: No  diarrhea: No  polydipsia: No  polyuria: No  difficulty urinating: No  hematuria: No  menstrual problem: No  dysuria: No  joint swelling: No  arthralgias: No  headaches: No  weakness: No  confusion: No  dysphoric mood: No

## 2023-08-10 LAB
BACTERIA UR CULT: NO GROWTH
BACTERIA UR CULT: NORMAL

## 2023-08-15 DIAGNOSIS — F51.04 CHRONIC INSOMNIA: ICD-10-CM

## 2023-08-15 RX ORDER — TRAZODONE HYDROCHLORIDE 100 MG/1
100 TABLET ORAL NIGHTLY
Qty: 30 TABLET | Refills: 0 | Status: SHIPPED | OUTPATIENT
Start: 2023-08-15 | End: 2023-09-11

## 2023-09-10 DIAGNOSIS — F51.04 CHRONIC INSOMNIA: ICD-10-CM

## 2023-09-11 RX ORDER — TRAZODONE HYDROCHLORIDE 100 MG/1
100 TABLET ORAL NIGHTLY
Qty: 30 TABLET | Refills: 0 | Status: SHIPPED | OUTPATIENT
Start: 2023-09-11 | End: 2023-10-24

## 2023-09-12 ENCOUNTER — HOSPITAL ENCOUNTER (OUTPATIENT)
Dept: RADIOLOGY | Facility: HOSPITAL | Age: 58
Discharge: HOME | End: 2023-09-12
Attending: OBSTETRICS & GYNECOLOGY
Payer: MEDICAID

## 2023-09-12 DIAGNOSIS — R92.8 ABNORMAL MAMMOGRAM: Primary | ICD-10-CM

## 2023-09-12 DIAGNOSIS — Z12.31 ENCOUNTER FOR SCREENING MAMMOGRAM FOR MALIGNANT NEOPLASM OF BREAST: ICD-10-CM

## 2023-09-12 PROCEDURE — 77067 SCR MAMMO BI INCL CAD: CPT | Mod: TC,PO

## 2023-10-24 DIAGNOSIS — F51.04 CHRONIC INSOMNIA: ICD-10-CM

## 2023-10-24 RX ORDER — TRAZODONE HYDROCHLORIDE 100 MG/1
100 TABLET ORAL NIGHTLY
Qty: 30 TABLET | Refills: 0 | Status: SHIPPED | OUTPATIENT
Start: 2023-10-24 | End: 2023-11-14

## 2023-10-30 ENCOUNTER — HOSPITAL ENCOUNTER (OUTPATIENT)
Dept: RADIOLOGY | Facility: HOSPITAL | Age: 58
Discharge: HOME | End: 2023-10-30
Attending: OBSTETRICS & GYNECOLOGY
Payer: MEDICAID

## 2023-10-30 VITALS — BODY MASS INDEX: 21 KG/M2 | HEIGHT: 60 IN | WEIGHT: 106.94 LBS

## 2023-10-30 DIAGNOSIS — R92.8 ABNORMAL MAMMOGRAM: ICD-10-CM

## 2023-10-30 PROCEDURE — 77065 DX MAMMO INCL CAD UNI: CPT | Mod: TC,PO,RT

## 2023-10-30 PROCEDURE — 76642 ULTRASOUND BREAST LIMITED: CPT | Mod: TC,PO,RT

## 2023-11-14 DIAGNOSIS — F51.04 CHRONIC INSOMNIA: ICD-10-CM

## 2023-11-14 RX ORDER — TRAZODONE HYDROCHLORIDE 100 MG/1
100 TABLET ORAL NIGHTLY
Qty: 30 TABLET | Refills: 0 | Status: SHIPPED | OUTPATIENT
Start: 2023-11-14 | End: 2023-12-15

## 2023-12-15 DIAGNOSIS — F51.04 CHRONIC INSOMNIA: ICD-10-CM

## 2023-12-15 RX ORDER — TRAZODONE HYDROCHLORIDE 100 MG/1
100 TABLET ORAL NIGHTLY
Qty: 30 TABLET | Refills: 0 | Status: SHIPPED | OUTPATIENT
Start: 2023-12-15 | End: 2024-01-17

## 2024-01-16 DIAGNOSIS — F51.04 CHRONIC INSOMNIA: ICD-10-CM

## 2024-01-17 RX ORDER — TRAZODONE HYDROCHLORIDE 100 MG/1
100 TABLET ORAL NIGHTLY
Qty: 30 TABLET | Refills: 0 | Status: SHIPPED | OUTPATIENT
Start: 2024-01-17 | End: 2024-02-15

## 2024-02-02 DIAGNOSIS — F51.04 CHRONIC INSOMNIA: ICD-10-CM

## 2024-02-05 RX ORDER — ZOLPIDEM TARTRATE 5 MG/1
5 TABLET ORAL NIGHTLY
Qty: 30 TABLET | Refills: 1 | Status: SHIPPED | OUTPATIENT
Start: 2024-02-05 | End: 2024-05-02

## 2024-02-13 DIAGNOSIS — F51.04 CHRONIC INSOMNIA: ICD-10-CM

## 2024-02-15 RX ORDER — TRAZODONE HYDROCHLORIDE 100 MG/1
100 TABLET ORAL NIGHTLY
Qty: 30 TABLET | Refills: 0 | Status: SHIPPED | OUTPATIENT
Start: 2024-02-15 | End: 2024-03-15

## 2024-02-26 ENCOUNTER — OFFICE VISIT (OUTPATIENT)
Dept: FAMILY MEDICINE | Facility: CLINIC | Age: 59
End: 2024-02-26
Payer: MEDICAID

## 2024-02-26 VITALS
WEIGHT: 103.63 LBS | BODY MASS INDEX: 20.35 KG/M2 | HEIGHT: 60 IN | RESPIRATION RATE: 20 BRPM | HEART RATE: 88 BPM | TEMPERATURE: 98 F | DIASTOLIC BLOOD PRESSURE: 70 MMHG | SYSTOLIC BLOOD PRESSURE: 120 MMHG

## 2024-02-26 DIAGNOSIS — N93.0 PCB (POST COITAL BLEEDING): Primary | ICD-10-CM

## 2024-02-26 DIAGNOSIS — Z00.00 ROUTINE HEALTH MAINTENANCE: ICD-10-CM

## 2024-02-26 DIAGNOSIS — R31.29 MICROSCOPIC HEMATURIA: ICD-10-CM

## 2024-02-26 PROCEDURE — 3008F BODY MASS INDEX DOCD: CPT | Mod: CPTII,,, | Performed by: NURSE PRACTITIONER

## 2024-02-26 PROCEDURE — 3074F SYST BP LT 130 MM HG: CPT | Mod: CPTII,,, | Performed by: NURSE PRACTITIONER

## 2024-02-26 PROCEDURE — 1159F MED LIST DOCD IN RCRD: CPT | Mod: CPTII,,, | Performed by: NURSE PRACTITIONER

## 2024-02-26 PROCEDURE — 99999 PR PBB SHADOW E&M-EST. PATIENT-LVL IV: CPT | Mod: PBBFAC,,, | Performed by: NURSE PRACTITIONER

## 2024-02-26 PROCEDURE — 99214 OFFICE O/P EST MOD 30 MIN: CPT | Mod: S$PBB,,, | Performed by: NURSE PRACTITIONER

## 2024-02-26 PROCEDURE — 3078F DIAST BP <80 MM HG: CPT | Mod: CPTII,,, | Performed by: NURSE PRACTITIONER

## 2024-02-26 PROCEDURE — 99214 OFFICE O/P EST MOD 30 MIN: CPT | Mod: PBBFAC,PN | Performed by: NURSE PRACTITIONER

## 2024-02-26 RX ORDER — AMMONIUM LACTATE 12 G/100G
LOTION TOPICAL
COMMUNITY
Start: 2024-02-21

## 2024-02-26 NOTE — PROGRESS NOTES
Patient ID: Tatyana Bello is a 58 y.o. female.    Chief Complaint: Annual Exam (57 yo female here for annual check up. Pt states no c/o. KM)    Mr. Bello presents today for follow up and updated blood work. She states she has been doing well overall but she has been experiencing excess bleeding after having sex. She was seen by Dr. Salmon last year and her pap came back abnormal. She is due for follow up pap next month. She does want a woman physician.so I will place a new referral. She denies any other issues at this time.       Past Medical History:   Diagnosis Date    Insomnia     Wears contact lenses      Past Surgical History:   Procedure Laterality Date    BREAST BIOPSY Right     CARDIAC ELECTROPHYSIOLOGY STUDY AND ABLATION      DIAGNOSTIC LAPAROSCOPY N/A 12/19/2018    Procedure: LAPAROSCOPY, DIAGNOSTIC;  Surgeon: Christy Garcia MD;  Location: [facility];  Service: OB/GYN;  Laterality: N/A;         Tobacco History:  reports that she has quit smoking. She has been exposed to tobacco smoke. She has never used smokeless tobacco.      Review of patient's allergies indicates:  No Known Allergies    Current Outpatient Medications:     ammonium lactate (LAC-HYDRIN) 12 % lotion, Apply topically., Disp: , Rfl:     estrogen, conjugated,-medroxyprogesterone 0.3-1.5 mg (PREMPRO) 0.3-1.5 mg per tablet, Prempro 0.3 mg-1.5 mg tablet  Take 1 tablet every day by oral route., Disp: 90 tablet, Rfl: 3    RETIN-A 0.1 % cream, SMARTSIG:sparingly Topical Every Night, Disp: , Rfl:     traZODone (DESYREL) 100 MG tablet, TAKE 1 TABLET BY MOUTH ONCE DAILY IN THE EVENING, Disp: 30 tablet, Rfl: 0    triamcinolone acetonide 0.1% (KENALOG) 0.1 % cream, Apply topically 2 (two) times daily., Disp: , Rfl:     zolpidem (AMBIEN) 5 MG Tab, Take 1 tablet (5 mg total) by mouth every evening., Disp: 30 tablet, Rfl: 1    Review of Systems   Constitutional:  Negative for activity change and unexpected weight change.   HENT:  Negative for  hearing loss, rhinorrhea and trouble swallowing.    Eyes:  Negative for discharge and visual disturbance.   Respiratory:  Negative for chest tightness and wheezing.    Cardiovascular:  Negative for chest pain and palpitations.   Gastrointestinal:  Negative for blood in stool, constipation, diarrhea and vomiting.   Endocrine: Negative for polydipsia and polyuria.   Genitourinary:  Positive for vaginal bleeding and vaginal pain. Negative for difficulty urinating, dysuria, hematuria and menstrual problem.   Musculoskeletal:  Negative for arthralgias, joint swelling and neck pain.   Neurological:  Negative for weakness and headaches.   Psychiatric/Behavioral:  Negative for confusion and dysphoric mood.           Objective:      Vitals:    02/26/24 1521   BP: 120/70   Pulse: 88   Resp: 20   Temp: 98 °F (36.7 °C)   TempSrc: Oral   Weight: 47 kg (103 lb 9.6 oz)   Height: 5' (1.524 m)     Physical Exam  Cardiovascular:      Rate and Rhythm: Normal rate.      Pulses: Normal pulses.      Heart sounds: Normal heart sounds.   Musculoskeletal:         General: Normal range of motion.   Skin:     General: Skin is warm.   Neurological:      Mental Status: She is alert and oriented to person, place, and time. Mental status is at baseline.   Psychiatric:         Mood and Affect: Mood normal.           Assessment:       1. PCB (post coital bleeding)    2. Routine health maintenance    3. Microscopic hematuria           Plan:       PCB (post coital bleeding)  -     Ambulatory referral/consult to Obstetrics / Gynecology; Future; Expected date: 03/04/2024  -     CBC W/ AUTO DIFFERENTIAL; Future; Expected date: 02/26/2024  -     FOLLICLE STIMULATING HORMONE; Future; Expected date: 02/26/2024    Routine health maintenance  -     COMPREHENSIVE METABOLIC PANEL; Future; Expected date: 02/26/2024  -     LIPID PANEL; Future; Expected date: 02/26/2024  -     TSH; Future; Expected date: 02/26/2024    Microscopic hematuria  -     Urinalysis,  Reflex to Urine Culture Urine, Clean Catch; Future; Expected date: 02/26/2024      Follow up in about 6 months (around 8/26/2024), or if symptoms worsen or fail to improve.        2/26/2024 Funmilayo Soto NP    Spent bogdan 30 minutes with patient which involved review of pts medical conditions, labs, medications and with 50% of time face-to-face discussion about medical problems, management and any applicable changes.

## 2024-03-04 ENCOUNTER — LAB VISIT (OUTPATIENT)
Dept: LAB | Facility: HOSPITAL | Age: 59
End: 2024-03-04
Attending: NURSE PRACTITIONER
Payer: MEDICAID

## 2024-03-04 DIAGNOSIS — R31.29 MICROSCOPIC HEMATURIA: ICD-10-CM

## 2024-03-04 DIAGNOSIS — N93.0 PCB (POST COITAL BLEEDING): ICD-10-CM

## 2024-03-04 DIAGNOSIS — Z00.00 ROUTINE HEALTH MAINTENANCE: ICD-10-CM

## 2024-03-04 LAB
ALBUMIN SERPL BCP-MCNC: 3.8 G/DL (ref 3.5–5.2)
ALP SERPL-CCNC: 60 U/L (ref 55–135)
ALT SERPL W/O P-5'-P-CCNC: 9 U/L (ref 10–44)
ANION GAP SERPL CALC-SCNC: 11 MMOL/L (ref 8–16)
AST SERPL-CCNC: 28 U/L (ref 10–40)
BACTERIA #/AREA URNS AUTO: ABNORMAL /HPF
BASOPHILS # BLD AUTO: 0.05 K/UL (ref 0–0.2)
BASOPHILS NFR BLD: 0.8 % (ref 0–1.9)
BILIRUB SERPL-MCNC: 1 MG/DL (ref 0.1–1)
BILIRUB UR QL STRIP: NEGATIVE
BUN SERPL-MCNC: 18 MG/DL (ref 6–20)
CALCIUM SERPL-MCNC: 9.3 MG/DL (ref 8.7–10.5)
CAOX CRY UR QL COMP ASSIST: ABNORMAL
CHLORIDE SERPL-SCNC: 106 MMOL/L (ref 95–110)
CHOLEST SERPL-MCNC: 149 MG/DL (ref 120–199)
CHOLEST/HDLC SERPL: 2.2 {RATIO} (ref 2–5)
CLARITY UR REFRACT.AUTO: ABNORMAL
CO2 SERPL-SCNC: 23 MMOL/L (ref 23–29)
COLOR UR AUTO: ABNORMAL
CREAT SERPL-MCNC: 0.7 MG/DL (ref 0.5–1.4)
DIFFERENTIAL METHOD BLD: ABNORMAL
EOSINOPHIL # BLD AUTO: 0.1 K/UL (ref 0–0.5)
EOSINOPHIL NFR BLD: 0.8 % (ref 0–8)
ERYTHROCYTE [DISTWIDTH] IN BLOOD BY AUTOMATED COUNT: 11.8 % (ref 11.5–14.5)
EST. GFR  (NO RACE VARIABLE): >60 ML/MIN/1.73 M^2
FSH SERPL-ACNC: 51.42 MIU/ML
GLUCOSE SERPL-MCNC: 95 MG/DL (ref 70–110)
GLUCOSE UR QL STRIP: NEGATIVE
HCT VFR BLD AUTO: 41 % (ref 37–48.5)
HDLC SERPL-MCNC: 68 MG/DL (ref 40–75)
HDLC SERPL: 45.6 % (ref 20–50)
HGB BLD-MCNC: 13.1 G/DL (ref 12–16)
HGB UR QL STRIP: ABNORMAL
IMM GRANULOCYTES # BLD AUTO: 0.01 K/UL (ref 0–0.04)
IMM GRANULOCYTES NFR BLD AUTO: 0.2 % (ref 0–0.5)
KETONES UR QL STRIP: NEGATIVE
LDLC SERPL CALC-MCNC: 61.8 MG/DL (ref 63–159)
LEUKOCYTE ESTERASE UR QL STRIP: NEGATIVE
LYMPHOCYTES # BLD AUTO: 2.5 K/UL (ref 1–4.8)
LYMPHOCYTES NFR BLD: 42.1 % (ref 18–48)
MCH RBC QN AUTO: 31.6 PG (ref 27–31)
MCHC RBC AUTO-ENTMCNC: 32 G/DL (ref 32–36)
MCV RBC AUTO: 99 FL (ref 82–98)
MICROSCOPIC COMMENT: ABNORMAL
MONOCYTES # BLD AUTO: 0.4 K/UL (ref 0.3–1)
MONOCYTES NFR BLD: 6.9 % (ref 4–15)
NEUTROPHILS # BLD AUTO: 2.9 K/UL (ref 1.8–7.7)
NEUTROPHILS NFR BLD: 49.2 % (ref 38–73)
NITRITE UR QL STRIP: NEGATIVE
NONHDLC SERPL-MCNC: 81 MG/DL
NRBC BLD-RTO: 0 /100 WBC
PH UR STRIP: 6 [PH] (ref 5–8)
PLATELET # BLD AUTO: 223 K/UL (ref 150–450)
PMV BLD AUTO: 10.6 FL (ref 9.2–12.9)
POTASSIUM SERPL-SCNC: 3.7 MMOL/L (ref 3.5–5.1)
PROT SERPL-MCNC: 6.9 G/DL (ref 6–8.4)
PROT UR QL STRIP: ABNORMAL
RBC # BLD AUTO: 4.15 M/UL (ref 4–5.4)
RBC #/AREA URNS AUTO: 8 /HPF (ref 0–4)
SODIUM SERPL-SCNC: 140 MMOL/L (ref 136–145)
SP GR UR STRIP: >1.03 (ref 1–1.03)
SQUAMOUS #/AREA URNS AUTO: 3 /HPF
TRIGL SERPL-MCNC: 96 MG/DL (ref 30–150)
TSH SERPL DL<=0.005 MIU/L-ACNC: 2.35 UIU/ML (ref 0.4–4)
URN SPEC COLLECT METH UR: ABNORMAL
WBC # BLD AUTO: 5.92 K/UL (ref 3.9–12.7)
WBC #/AREA URNS AUTO: 8 /HPF (ref 0–5)

## 2024-03-04 PROCEDURE — 84443 ASSAY THYROID STIM HORMONE: CPT | Performed by: NURSE PRACTITIONER

## 2024-03-04 PROCEDURE — 81001 URINALYSIS AUTO W/SCOPE: CPT | Performed by: NURSE PRACTITIONER

## 2024-03-04 PROCEDURE — 85025 COMPLETE CBC W/AUTO DIFF WBC: CPT | Performed by: NURSE PRACTITIONER

## 2024-03-04 PROCEDURE — 80061 LIPID PANEL: CPT | Performed by: NURSE PRACTITIONER

## 2024-03-04 PROCEDURE — 83001 ASSAY OF GONADOTROPIN (FSH): CPT | Performed by: NURSE PRACTITIONER

## 2024-03-04 PROCEDURE — 80053 COMPREHEN METABOLIC PANEL: CPT | Performed by: NURSE PRACTITIONER

## 2024-03-04 PROCEDURE — 36415 COLL VENOUS BLD VENIPUNCTURE: CPT | Performed by: NURSE PRACTITIONER

## 2024-03-11 ENCOUNTER — TELEPHONE (OUTPATIENT)
Dept: OBSTETRICS AND GYNECOLOGY | Facility: CLINIC | Age: 59
End: 2024-03-11
Payer: MEDICAID

## 2024-03-11 NOTE — TELEPHONE ENCOUNTER
----- Message from Jessica Hayward sent at 3/11/2024  3:33 PM CDT -----  Contact: Self  Type:  Sooner Appointment Request    Caller is requesting a sooner appointment.  Caller declined first available appointment listed below.  Caller will not accept being placed on the waitlist and is requesting a message be sent to doctor.    Name of Caller:  Patient  When is the first available appointment?  N/A  Symptoms:  PCB (post coital bleeding) [N93.0] (referral)  Would the patient rather a call back or a response via [facility]? Call  Best Call Back Number:  [phone]  Additional Information:  Pt was seeing Dr Salmon but is requesting to be seen by a female provider, can we please call pt back to assist, asking for any Monday in the afternoon. Thank You.

## 2024-03-11 NOTE — TELEPHONE ENCOUNTER
----- Message from Katie Zambrano sent at 3/11/2024  3:58 PM CDT -----  Contact: Patient  Type:  Patient Returning Call    Who Called:  Patient  Who Left Message for Patient:  Sandeep  Does the patient know what this is regarding?:  Possible appointment    Would the patient rather a call back or a response via [facility]?   Call back  Best Call Back Number:  [phone]    Additional Information:   States she is returning a missed call - please call back - thank you

## 2024-03-11 NOTE — TELEPHONE ENCOUNTER
Spoke with pt to schedule appt with Dr. Caceres. Appt set for 4/15/24 @ 1600. Pt verbalized understanding.

## 2024-03-15 DIAGNOSIS — F51.04 CHRONIC INSOMNIA: ICD-10-CM

## 2024-03-15 RX ORDER — TRAZODONE HYDROCHLORIDE 100 MG/1
100 TABLET ORAL NIGHTLY
Qty: 30 TABLET | Refills: 0 | Status: SHIPPED | OUTPATIENT
Start: 2024-03-15 | End: 2024-04-11

## 2024-04-10 DIAGNOSIS — F51.04 CHRONIC INSOMNIA: ICD-10-CM

## 2024-04-11 RX ORDER — TRAZODONE HYDROCHLORIDE 100 MG/1
100 TABLET ORAL NIGHTLY
Qty: 30 TABLET | Refills: 0 | Status: SHIPPED | OUTPATIENT
Start: 2024-04-11 | End: 2024-05-09

## 2024-04-15 ENCOUNTER — OFFICE VISIT (OUTPATIENT)
Dept: OBSTETRICS AND GYNECOLOGY | Facility: CLINIC | Age: 59
End: 2024-04-15
Payer: MEDICAID

## 2024-04-15 VITALS
DIASTOLIC BLOOD PRESSURE: 84 MMHG | HEIGHT: 60 IN | BODY MASS INDEX: 20.62 KG/M2 | WEIGHT: 105.06 LBS | SYSTOLIC BLOOD PRESSURE: 116 MMHG

## 2024-04-15 DIAGNOSIS — N93.0 PCB (POST COITAL BLEEDING): ICD-10-CM

## 2024-04-15 LAB
AMORPH CRY UR QL COMP ASSIST: NORMAL
BILIRUB UR QL STRIP: NEGATIVE
CLARITY UR REFRACT.AUTO: ABNORMAL
COLOR UR AUTO: YELLOW
GLUCOSE UR QL STRIP: NEGATIVE
HGB UR QL STRIP: NEGATIVE
KETONES UR QL STRIP: ABNORMAL
LEUKOCYTE ESTERASE UR QL STRIP: NEGATIVE
MICROSCOPIC COMMENT: NORMAL
NITRITE UR QL STRIP: NEGATIVE
PH UR STRIP: 5 [PH] (ref 5–8)
PROT UR QL STRIP: NEGATIVE
RBC #/AREA URNS AUTO: 0 /HPF (ref 0–4)
SP GR UR STRIP: 1.02 (ref 1–1.03)
URN SPEC COLLECT METH UR: ABNORMAL

## 2024-04-15 PROCEDURE — 3074F SYST BP LT 130 MM HG: CPT | Mod: CPTII,,, | Performed by: GENERAL PRACTICE

## 2024-04-15 PROCEDURE — 99213 OFFICE O/P EST LOW 20 MIN: CPT | Mod: PBBFAC,PO | Performed by: GENERAL PRACTICE

## 2024-04-15 PROCEDURE — 1159F MED LIST DOCD IN RCRD: CPT | Mod: CPTII,,, | Performed by: GENERAL PRACTICE

## 2024-04-15 PROCEDURE — 99214 OFFICE O/P EST MOD 30 MIN: CPT | Mod: S$PBB,,, | Performed by: GENERAL PRACTICE

## 2024-04-15 PROCEDURE — 99999 PR PBB SHADOW E&M-EST. PATIENT-LVL III: CPT | Mod: PBBFAC,,, | Performed by: GENERAL PRACTICE

## 2024-04-15 PROCEDURE — 3079F DIAST BP 80-89 MM HG: CPT | Mod: CPTII,,, | Performed by: GENERAL PRACTICE

## 2024-04-15 PROCEDURE — 3008F BODY MASS INDEX DOCD: CPT | Mod: CPTII,,, | Performed by: GENERAL PRACTICE

## 2024-04-15 PROCEDURE — 87086 URINE CULTURE/COLONY COUNT: CPT | Performed by: GENERAL PRACTICE

## 2024-04-15 PROCEDURE — 81001 URINALYSIS AUTO W/SCOPE: CPT | Performed by: GENERAL PRACTICE

## 2024-04-15 NOTE — PROGRESS NOTES
HISTORY OF THE PRESENT ILLNESS    04/15/2024  Tatyana Bello is a 58 y.o. here for evaluation of postcoital bleeding.  Bleeding every time she has sex.  For 1-2 yrs.  Bright red, small amount.  Doesn't really happen outside of having sex.  Forgets Prempro sometimes - notices that can make it worse.  Saw Urology in [place] OCT 2023.  Last note I see from Urology is 2018.    G'sP's:   LMP: unsure when menopause happened - transitioned from DEMETRIA to HRT  Relationship: sexually active and boyfriend x 8yrs (prior divorce)  Contraception:  menopause    PAP / HPV (MAR 2023) = ASCUS / neg  PAP / HPV (MAR 2022) = neg / neg    HRT: Prempro >2yrs  MMG + US (OCT 2023) = neg --> 1yr f/u     LABS & RADS   TSH (MAR 2024) = 2.346  FSH (MAR 2024) = 51       PAST MEDICAL HISTORY  Abnormal heart rhythm - patient reported  Hematuria - seen by Urology    PAST SURGICAL HISTORY  Past Surgical History:   Procedure Laterality Date    BREAST BIOPSY Right     CARDIAC ELECTROPHYSIOLOGY STUDY AND ABLATION      DIAGNOSTIC LAPAROSCOPY N/A 2018    Procedure: LAPAROSCOPY, DIAGNOSTIC;  Surgeon: Christy Garcia MD;  Location: [facility] OR;  Service: OB/GYN;  Laterality: N/A;     ALLERGIES  Review of patient's allergies indicates:  No Known Allergies    MEDICATIONS  Current Outpatient Medications   Medication Instructions    ammonium lactate (LAC-HYDRIN) 12 % lotion Apply topically.    estrogen, conjugated,-medroxyprogesterone 0.3-1.5 mg (PREMPRO) 0.3-1.5 mg per tablet Prempro 0.3 mg-1.5 mg tablet  Take 1 tablet every day by oral route.    RETIN-A 0.1 % cream SMARTSIG:sparingly Topical Every Night    traZODone (DESYREL) 100 mg, Oral, Nightly    triamcinolone acetonide 0.1% (KENALOG) 0.1 % cream Topical (Top), 2 times daily    zolpidem (AMBIEN) 5 mg, Oral, Nightly       GYNECOLOGIC HISTORY  PAP'S: no h/o abnormals  Genital HSV: no  Other STI'S: no past history    Menarche: 14 yoa  Non-menstrual pelvic pressure/pain: No    Vaginal  dryness: yes    OBSTETRIC HISTORY  Living children: 1  Vaginal deliveries: 1    SOCIAL HISTORY  Lives with: boyfriend  Smoker: former smoker  Alcohol:  2 drinks a day  Drugs: denies  Domestic Violence: no  Occupation:      FAMILY HISTORY  BREAST/UTERINE/OVARIAN CANCER: sister  34 of breast CA  COLON CA: sister (different sister)  BLEEDING DISORDERS: none  CLOTTING DISORDERS: none    --------------------------------------------------------------------------------------------------------------    PHYSICAL EXAM  VITALS:  Vitals:    04/15/24 1622   BP: 116/84       GEN = alert/oriented, nad, pleasant  HEENT = sclera anicteric, EOM grossly normal   = deferred, no acute concerns      ASSESSMENT AND PLAN:  58 y.o. with postcoital bleeding the past 1-2yrs as per HPI.  On Prempro and sometimes forgets pills.  Cervical CA screening up to date.    rads: pelvic US    next cervical CA screen due MAR 2026    MMG up to date    RTC for EMBx after ultrasound is done    MD RONALDO

## 2024-04-17 LAB — BACTERIA UR CULT: NO GROWTH

## 2024-04-20 NOTE — PROGRESS NOTES
Please call patient with normal results. Urinalysis did not show blood.  We will continue to monitor periodically but she shouldn't need to see urology at this point.
Urine collected to be sent to lab    
No

## 2024-04-22 ENCOUNTER — HOSPITAL ENCOUNTER (OUTPATIENT)
Dept: RADIOLOGY | Facility: HOSPITAL | Age: 59
Discharge: HOME | End: 2024-04-22
Attending: GENERAL PRACTICE
Payer: MEDICAID

## 2024-04-22 DIAGNOSIS — N93.0 PCB (POST COITAL BLEEDING): ICD-10-CM

## 2024-04-22 PROCEDURE — 76856 US EXAM PELVIC COMPLETE: CPT | Mod: TC,PO

## 2024-04-22 PROCEDURE — 76856 US EXAM PELVIC COMPLETE: CPT | Mod: 26,,, | Performed by: RADIOLOGY

## 2024-04-22 PROCEDURE — 76830 TRANSVAGINAL US NON-OB: CPT | Mod: TC,PO

## 2024-04-22 PROCEDURE — 76830 TRANSVAGINAL US NON-OB: CPT | Mod: 26,,, | Performed by: RADIOLOGY

## 2024-05-02 ENCOUNTER — PATIENT MESSAGE (OUTPATIENT)
Dept: OBSTETRICS AND GYNECOLOGY | Facility: CLINIC | Age: 59
End: 2024-05-02

## 2024-05-02 ENCOUNTER — OFFICE VISIT (OUTPATIENT)
Dept: OBSTETRICS AND GYNECOLOGY | Facility: CLINIC | Age: 59
End: 2024-05-02
Payer: MEDICAID

## 2024-05-02 VITALS
BODY MASS INDEX: 20.66 KG/M2 | SYSTOLIC BLOOD PRESSURE: 118 MMHG | WEIGHT: 105.25 LBS | HEIGHT: 60 IN | DIASTOLIC BLOOD PRESSURE: 66 MMHG

## 2024-05-02 DIAGNOSIS — N95.0 PMB (POSTMENOPAUSAL BLEEDING): Primary | ICD-10-CM

## 2024-05-02 PROCEDURE — 3078F DIAST BP <80 MM HG: CPT | Mod: CPTII,,, | Performed by: GENERAL PRACTICE

## 2024-05-02 PROCEDURE — 3008F BODY MASS INDEX DOCD: CPT | Mod: CPTII,,, | Performed by: GENERAL PRACTICE

## 2024-05-02 PROCEDURE — 99999 PR PBB SHADOW E&M-EST. PATIENT-LVL III: CPT | Mod: PBBFAC,,, | Performed by: GENERAL PRACTICE

## 2024-05-02 PROCEDURE — 99213 OFFICE O/P EST LOW 20 MIN: CPT | Mod: S$PBB,,, | Performed by: GENERAL PRACTICE

## 2024-05-02 PROCEDURE — 99213 OFFICE O/P EST LOW 20 MIN: CPT | Mod: PBBFAC,PO | Performed by: GENERAL PRACTICE

## 2024-05-02 PROCEDURE — 3074F SYST BP LT 130 MM HG: CPT | Mod: CPTII,,, | Performed by: GENERAL PRACTICE

## 2024-05-02 PROCEDURE — 1159F MED LIST DOCD IN RCRD: CPT | Mod: CPTII,,, | Performed by: GENERAL PRACTICE

## 2024-05-02 RX ORDER — SODIUM CHLORIDE 9 MG/ML
INJECTION, SOLUTION INTRAVENOUS CONTINUOUS
Status: CANCELLED | OUTPATIENT
Start: 2024-05-02

## 2024-05-02 NOTE — PROGRESS NOTES
SUBJECTIVE   2024    Tatyana Bello is a 57yo post-menopausal female here for f/u after recent pelvic US.  I first saw her 4/15/24 with complaint of postcoital bleeding that's been going on for 1-2yrs.    G'sP's:   LMP: unsure when menopause happened - transitioned from DEMETRIA to HRT  Relationship: sexually active and boyfriend x 8yrs (prior divorce)  Contraception: menopause    PAP / HPV (MAR 2023) = ASCUS / neg  PAP / HPV (MAR 2022) = neg / neg    HRT: Prempro >2yrs  MMG + US (OCT 2023) = neg --> 1yr f/u       OBJECTIVE   Vitals:    24 0849   BP: 118/66     GEN = alert/oriented, nad, pleasant  HEENT = sclera anicteric, EOM grossly normal  CV = rrr, no murmur appreciated  PULM = cta bilat   = deferred, no acute concerns    LABS & RADS     TSH (MAR 2024) = 2.346    FSH (MAR 2024) = 51    Ur Cx (15 APR 2024) = neg    PELVIC US (2024) =  Uterus 6 x 4 x 5 cm  EMS 16 --> 6 mm  ROV 2 x 1 x 1 cm  LOV 2 x 1 x 1 cm  Other: [id number] solid nodule within endometrial canal causing EM to be thickened to 16mm in this area         ASSESSMENT / PLAN   58 y.o. with likely intrauterine polyp vs fibroid per recent ultrasound in the setting of postcoital bleeding.  Discussed recommendation for evaluation with planned removal of this intrauterine mass via hysteroscopy.    next cervical CA screen due MAR 2026  MMG up to date    Planned surgery: hysteroscopy, D&C    Informed consent obtained for surgery.  The patient wishes to proceed with the above listed procedure(s).  She unserstands risks of surgery include bleeding, need for blood transfusion, infection, pain, scarring, damage to nearby organs or tissues, need for other surgery, inadequate diagnosis, inadequate treatment of her symptoms, stroke, heart attack, blood clots such as DVT or PE, complications from anesthesia, allergic reactions to medications or devices, and death.  Specific to this procedure the patient additionally understands the risks of  uterine perforation, excessive absorption of fluid with resultant hyponatremia or volume overload, intrauterine adhesions which may adversely affect fertility.  Alternatives to the planned procedure include medication management without biopsy, expectant management, do nothing, hysterectomy..    OR date: 13 MAY 2024    Anesthesia plan: choice    Antibiotic prophylaxis: none    Medication management: morning meds with a sip of water unless otherwise directed by hospital/anesthesia staff  The patient is NOT taking any GLP-1 agonist medications, which can cause a delay in gastric emptying (dulaglutide/Trulicity weekly, exenatide/Bydureon weekly or BID, semaglutide/Ozempic weekly, liraglutide/Victoza daily, lixisenatide/Adlyxin daily, or semaglutide/Rybelsus daily).    Postoperative expectations reviewed.  Pelvic rest, lifting and activity restrictions, driving restrictions, and medications.    MD RONALDO    --------------------------------------------    HISTORY OF THE PRESENT ILLNESS    04/15/2024  Tatyana Bello is a 58 y.o. here for evaluation of postcoital bleeding.  Bleeding every time she has sex.  For 1-2 yrs.  Bright red, small amount.  Doesn't really happen outside of having sex.  Forgets Prempro sometimes - notices that can make it worse.  Saw Urology in [place] OCT 2023.  Last note I see from Urology is 2018.    G'sP's:   LMP: unsure when menopause happened - transitioned from DEMETRIA to HRT  Relationship: sexually active and boyfriend x 8yrs (prior divorce)  Contraception: menopause    PAP / HPV (MAR 2023) = ASCUS / neg  PAP / HPV (MAR 2022) = neg / neg    HRT: Prempro >2yrs  MMG + US (OCT 2023) = neg --> 1yr f/u     LABS & RADS   TSH (MAR 2024) = 2.346  FSH (MAR 2024) = 51       PAST MEDICAL HISTORY  Abnormal heart rhythm - patient reported  Hematuria - seen by Urology    PAST SURGICAL HISTORY  Past Surgical History:   Procedure Laterality Date    BREAST BIOPSY Right     CARDIAC ELECTROPHYSIOLOGY  STUDY AND ABLATION      DIAGNOSTIC LAPAROSCOPY N/A 2018    Procedure: LAPAROSCOPY, DIAGNOSTIC;  Surgeon: Christy Garcia MD;  Location: [facility] OR;  Service: OB/GYN;  Laterality: N/A;     ALLERGIES  Review of patient's allergies indicates:  No Known Allergies    MEDICATIONS  Current Outpatient Medications   Medication Instructions    ammonium lactate (LAC-HYDRIN) 12 % lotion Apply topically.    estrogen, conjugated,-medroxyprogesterone 0.3-1.5 mg (PREMPRO) 0.3-1.5 mg per tablet Prempro 0.3 mg-1.5 mg tablet  Take 1 tablet every day by oral route.    RETIN-A 0.1 % cream SMARTSIG:sparingly Topical Every Night    traZODone (DESYREL) 100 mg, Oral, Nightly    triamcinolone acetonide 0.1% (KENALOG) 0.1 % cream Topical (Top), 2 times daily    zolpidem (AMBIEN) 5 mg, Oral, Nightly       GYNECOLOGIC HISTORY  PAP'S: no h/o abnormals  Genital HSV: no  Other STI'S: no past history    Menarche: 14 yoa  Non-menstrual pelvic pressure/pain: No    Vaginal dryness: yes    OBSTETRIC HISTORY  Living children: 1  Vaginal deliveries: 1    SOCIAL HISTORY  Lives with: boyfriend  Smoker: former smoker  Alcohol: 2 drinks a day  Drugs: denies  Domestic Violence: no  Occupation:     FAMILY HISTORY  BREAST/UTERINE/OVARIAN CANCER: sister  34 of breast CA  COLON CA: sister (different sister)  BLEEDING DISORDERS: none  CLOTTING DISORDERS: none    --------------------------------------------------------------------------------------------------------------    PHYSICAL EXAM  VITALS:  Vitals:    04/15/24 1622   BP: 116/84       GEN = alert/oriented, nad, pleasant  HEENT = sclera anicteric, EOM grossly normal   = deferred, no acute concerns      ASSESSMENT AND PLAN:  58 y.o. with postcoital bleeding the past 1-2yrs as per HPI.  On Prempro and sometimes forgets pills.  Cervical CA screening up to date.    rads: pelvic US    next cervical CA screen due MAR 2026    MMG up to date    RTC for EMBx after ultrasound is done    WEEKS,  MD

## 2024-05-02 NOTE — H&P (VIEW-ONLY)
SUBJECTIVE   2024    Tatyana Bello is a 57yo post-menopausal female here for f/u after recent pelvic US.  I first saw her 4/15/24 with complaint of postcoital bleeding that's been going on for 1-2yrs.    G'sP's:   LMP: unsure when menopause happened - transitioned from DEMETRIA to HRT  Relationship: sexually active and boyfriend x 8yrs (prior divorce)  Contraception: menopause    PAP / HPV (MAR 2023) = ASCUS / neg  PAP / HPV (MAR 2022) = neg / neg    HRT: Prempro >2yrs  MMG + US (OCT 2023) = neg --> 1yr f/u       OBJECTIVE   Vitals:    24 0849   BP: 118/66     GEN = alert/oriented, nad, pleasant  HEENT = sclera anicteric, EOM grossly normal  CV = rrr, no murmur appreciated  PULM = cta bilat   = deferred, no acute concerns    LABS & RADS     TSH (MAR 2024) = 2.346    FSH (MAR 2024) = 51    Ur Cx (15 APR 2024) = neg    PELVIC US (2024) =  Uterus 6 x 4 x 5 cm  EMS 16 --> 6 mm  ROV 2 x 1 x 1 cm  LOV 2 x 1 x 1 cm  Other: [id number] solid nodule within endometrial canal causing EM to be thickened to 16mm in this area         ASSESSMENT / PLAN   58 y.o. with likely intrauterine polyp vs fibroid per recent ultrasound in the setting of postcoital bleeding.  Discussed recommendation for evaluation with planned removal of this intrauterine mass via hysteroscopy.    next cervical CA screen due MAR 2026  MMG up to date    Planned surgery: hysteroscopy, D&C    Informed consent obtained for surgery.  The patient wishes to proceed with the above listed procedure(s).  She unserstands risks of surgery include bleeding, need for blood transfusion, infection, pain, scarring, damage to nearby organs or tissues, need for other surgery, inadequate diagnosis, inadequate treatment of her symptoms, stroke, heart attack, blood clots such as DVT or PE, complications from anesthesia, allergic reactions to medications or devices, and death.  Specific to this procedure the patient additionally understands the risks of  uterine perforation, excessive absorption of fluid with resultant hyponatremia or volume overload, intrauterine adhesions which may adversely affect fertility.  Alternatives to the planned procedure include medication management without biopsy, expectant management, do nothing, hysterectomy..    OR date: 13 MAY 2024    Anesthesia plan: choice    Antibiotic prophylaxis: none    Medication management: morning meds with a sip of water unless otherwise directed by hospital/anesthesia staff  The patient is NOT taking any GLP-1 agonist medications, which can cause a delay in gastric emptying (dulaglutide/Trulicity weekly, exenatide/Bydureon weekly or BID, semaglutide/Ozempic weekly, liraglutide/Victoza daily, lixisenatide/Adlyxin daily, or semaglutide/Rybelsus daily).    Postoperative expectations reviewed.  Pelvic rest, lifting and activity restrictions, driving restrictions, and medications.    MD RONALDO    --------------------------------------------    HISTORY OF THE PRESENT ILLNESS    04/15/2024  Tatyana Bello is a 58 y.o. here for evaluation of postcoital bleeding.  Bleeding every time she has sex.  For 1-2 yrs.  Bright red, small amount.  Doesn't really happen outside of having sex.  Forgets Prempro sometimes - notices that can make it worse.  Saw Urology in [place] OCT 2023.  Last note I see from Urology is 2018.    G'sP's:   LMP: unsure when menopause happened - transitioned from DEMETRIA to HRT  Relationship: sexually active and boyfriend x 8yrs (prior divorce)  Contraception: menopause    PAP / HPV (MAR 2023) = ASCUS / neg  PAP / HPV (MAR 2022) = neg / neg    HRT: Prempro >2yrs  MMG + US (OCT 2023) = neg --> 1yr f/u     LABS & RADS   TSH (MAR 2024) = 2.346  FSH (MAR 2024) = 51       PAST MEDICAL HISTORY  Abnormal heart rhythm - patient reported  Hematuria - seen by Urology    PAST SURGICAL HISTORY  Past Surgical History:   Procedure Laterality Date    BREAST BIOPSY Right     CARDIAC ELECTROPHYSIOLOGY  STUDY AND ABLATION      DIAGNOSTIC LAPAROSCOPY N/A 2018    Procedure: LAPAROSCOPY, DIAGNOSTIC;  Surgeon: Christy Garcia MD;  Location: [facility] OR;  Service: OB/GYN;  Laterality: N/A;     ALLERGIES  Review of patient's allergies indicates:  No Known Allergies    MEDICATIONS  Current Outpatient Medications   Medication Instructions    ammonium lactate (LAC-HYDRIN) 12 % lotion Apply topically.    estrogen, conjugated,-medroxyprogesterone 0.3-1.5 mg (PREMPRO) 0.3-1.5 mg per tablet Prempro 0.3 mg-1.5 mg tablet  Take 1 tablet every day by oral route.    RETIN-A 0.1 % cream SMARTSIG:sparingly Topical Every Night    traZODone (DESYREL) 100 mg, Oral, Nightly    triamcinolone acetonide 0.1% (KENALOG) 0.1 % cream Topical (Top), 2 times daily    zolpidem (AMBIEN) 5 mg, Oral, Nightly       GYNECOLOGIC HISTORY  PAP'S: no h/o abnormals  Genital HSV: no  Other STI'S: no past history    Menarche: 14 yoa  Non-menstrual pelvic pressure/pain: No    Vaginal dryness: yes    OBSTETRIC HISTORY  Living children: 1  Vaginal deliveries: 1    SOCIAL HISTORY  Lives with: boyfriend  Smoker: former smoker  Alcohol: 2 drinks a day  Drugs: denies  Domestic Violence: no  Occupation:     FAMILY HISTORY  BREAST/UTERINE/OVARIAN CANCER: sister  34 of breast CA  COLON CA: sister (different sister)  BLEEDING DISORDERS: none  CLOTTING DISORDERS: none    --------------------------------------------------------------------------------------------------------------    PHYSICAL EXAM  VITALS:  Vitals:    04/15/24 1622   BP: 116/84       GEN = alert/oriented, nad, pleasant  HEENT = sclera anicteric, EOM grossly normal   = deferred, no acute concerns      ASSESSMENT AND PLAN:  58 y.o. with postcoital bleeding the past 1-2yrs as per HPI.  On Prempro and sometimes forgets pills.  Cervical CA screening up to date.    rads: pelvic US    next cervical CA screen due MAR 2026    MMG up to date    RTC for EMBx after ultrasound is done    WEEKS,  MD

## 2024-05-03 ENCOUNTER — TELEPHONE (OUTPATIENT)
Dept: OBSTETRICS AND GYNECOLOGY | Facility: CLINIC | Age: 59
End: 2024-05-03
Payer: MEDICAID

## 2024-05-03 NOTE — TELEPHONE ENCOUNTER
Contacted pt to inform her that we can move forward with her having her Sx on 5/27/24 with Dr. Caceres. Pt was informed that the ASC will be in contact with her to ask questions about her medical Hx and other information. Pt verbalized understanding.

## 2024-05-03 NOTE — TELEPHONE ENCOUNTER
Spoke with pt to inform her that her Sx will be scheduled for 5/27/24. Pt was informed that the surgery center will be in touch with her about arrival time. Pt verbalized understanding.

## 2024-05-03 NOTE — TELEPHONE ENCOUNTER
----- Message from Mira Caceres MD sent at 5/3/2024  7:55 AM CDT -----  Just noticed Dr. SHAIKH has several cases in the [facility] on May 13. Will need to move this patient to May 20 in the Main OR. Please let her know and make changes with the OR. Thanks.

## 2024-05-09 DIAGNOSIS — F51.04 CHRONIC INSOMNIA: ICD-10-CM

## 2024-05-09 RX ORDER — TRAZODONE HYDROCHLORIDE 100 MG/1
100 TABLET ORAL NIGHTLY
Qty: 30 TABLET | Refills: 0 | Status: SHIPPED | OUTPATIENT
Start: 2024-05-09 | End: 2024-06-03

## 2024-05-18 DIAGNOSIS — Z78.0 MENOPAUSE: ICD-10-CM

## 2024-05-22 NOTE — DISCHARGE INSTRUCTIONS
No douches, tampons, intercourse or tub baths for 1 week;     Some bleeding and pain is expected, but should be no greater than a normal period. If heavy bleeding or pain persists, please contact your doctor;     Keep followup appointment as scheduled.     Do not drive or make important business or legal decisions for 24 hours.     Call MD if severe bleeding, fever > 101, nausea/vomitting    Rx for pain:

## 2024-05-23 ENCOUNTER — ANESTHESIA EVENT (OUTPATIENT)
Dept: SURGERY | Facility: HOSPITAL | Age: 59
End: 2024-05-23
Payer: MEDICAID

## 2024-05-23 DIAGNOSIS — Z86.79 HISTORY OF CARDIAC ARRHYTHMIA: Primary | ICD-10-CM

## 2024-05-25 ENCOUNTER — HOSPITAL ENCOUNTER (OUTPATIENT)
Dept: CARDIOLOGY | Facility: HOSPITAL | Age: 59
Discharge: HOME | End: 2024-05-25
Attending: ANESTHESIOLOGY
Payer: MEDICAID

## 2024-05-25 DIAGNOSIS — Z86.79 HISTORY OF CARDIAC ARRHYTHMIA: ICD-10-CM

## 2024-05-25 PROCEDURE — 93010 ELECTROCARDIOGRAM REPORT: CPT | Mod: ,,, | Performed by: INTERNAL MEDICINE

## 2024-05-25 PROCEDURE — 93005 ELECTROCARDIOGRAM TRACING: CPT

## 2024-05-27 ENCOUNTER — TELEPHONE (OUTPATIENT)
Dept: OBSTETRICS AND GYNECOLOGY | Facility: CLINIC | Age: 59
End: 2024-05-27

## 2024-05-27 ENCOUNTER — HOSPITAL ENCOUNTER (OUTPATIENT)
Facility: HOSPITAL | Age: 59
Discharge: HOME | End: 2024-05-27
Attending: GENERAL PRACTICE | Admitting: GENERAL PRACTICE
Payer: MEDICAID

## 2024-05-27 ENCOUNTER — ANESTHESIA (OUTPATIENT)
Dept: SURGERY | Facility: HOSPITAL | Age: 59
End: 2024-05-27
Payer: MEDICAID

## 2024-05-27 DIAGNOSIS — N95.0 PMB (POSTMENOPAUSAL BLEEDING): ICD-10-CM

## 2024-05-27 PROCEDURE — 37000008 HC ANESTHESIA 1ST 15 MINUTES: Performed by: GENERAL PRACTICE

## 2024-05-27 PROCEDURE — 25000003 PHARM REV CODE 250: Performed by: GENERAL PRACTICE

## 2024-05-27 PROCEDURE — D9220A PRA ANESTHESIA: Mod: ANES,,, | Performed by: ANESTHESIOLOGY

## 2024-05-27 PROCEDURE — 25000003 PHARM REV CODE 250: Performed by: STUDENT IN AN ORGANIZED HEALTH CARE EDUCATION/TRAINING PROGRAM

## 2024-05-27 PROCEDURE — 27201423 OPTIME MED/SURG SUP & DEVICES STERILE SUPPLY: Performed by: GENERAL PRACTICE

## 2024-05-27 PROCEDURE — 71000039 HC RECOVERY, EACH ADD'L HOUR: Performed by: GENERAL PRACTICE

## 2024-05-27 PROCEDURE — C1782 MORCELLATOR: HCPCS | Performed by: GENERAL PRACTICE

## 2024-05-27 PROCEDURE — 63600175 PHARM REV CODE 636 W HCPCS: Performed by: ANESTHESIOLOGY

## 2024-05-27 PROCEDURE — D9220A PRA ANESTHESIA: Mod: CRNA,,, | Performed by: STUDENT IN AN ORGANIZED HEALTH CARE EDUCATION/TRAINING PROGRAM

## 2024-05-27 PROCEDURE — 58558 HYSTEROSCOPY BIOPSY: CPT | Mod: ,,, | Performed by: GENERAL PRACTICE

## 2024-05-27 PROCEDURE — 63600175 PHARM REV CODE 636 W HCPCS: Performed by: STUDENT IN AN ORGANIZED HEALTH CARE EDUCATION/TRAINING PROGRAM

## 2024-05-27 PROCEDURE — 37000009 HC ANESTHESIA EA ADD 15 MINS: Performed by: GENERAL PRACTICE

## 2024-05-27 PROCEDURE — 36000706: Performed by: GENERAL PRACTICE

## 2024-05-27 PROCEDURE — 25000003 PHARM REV CODE 250: Performed by: ANESTHESIOLOGY

## 2024-05-27 PROCEDURE — 71000033 HC RECOVERY, INTIAL HOUR: Performed by: GENERAL PRACTICE

## 2024-05-27 PROCEDURE — 36000707: Performed by: GENERAL PRACTICE

## 2024-05-27 RX ORDER — KETOROLAC TROMETHAMINE 30 MG/ML
INJECTION, SOLUTION INTRAMUSCULAR; INTRAVENOUS
Status: DISCONTINUED | OUTPATIENT
Start: 2024-05-27 | End: 2024-05-27

## 2024-05-27 RX ORDER — ACETAMINOPHEN 10 MG/ML
INJECTION, SOLUTION INTRAVENOUS
Status: DISCONTINUED | OUTPATIENT
Start: 2024-05-27 | End: 2024-05-27

## 2024-05-27 RX ORDER — DEXAMETHASONE SODIUM PHOSPHATE 4 MG/ML
INJECTION, SOLUTION INTRA-ARTICULAR; INTRALESIONAL; INTRAMUSCULAR; INTRAVENOUS; SOFT TISSUE
Status: DISCONTINUED | OUTPATIENT
Start: 2024-05-27 | End: 2024-05-27

## 2024-05-27 RX ORDER — PROPOFOL 10 MG/ML
VIAL (ML) INTRAVENOUS
Status: DISCONTINUED | OUTPATIENT
Start: 2024-05-27 | End: 2024-05-27

## 2024-05-27 RX ORDER — LIDOCAINE HYDROCHLORIDE 10 MG/ML
1 INJECTION, SOLUTION EPIDURAL; INFILTRATION; INTRACAUDAL; PERINEURAL ONCE
Status: COMPLETED | OUTPATIENT
Start: 2024-05-27 | End: 2024-05-27

## 2024-05-27 RX ORDER — MIDAZOLAM HYDROCHLORIDE 1 MG/ML
INJECTION INTRAMUSCULAR; INTRAVENOUS
Status: DISCONTINUED | OUTPATIENT
Start: 2024-05-27 | End: 2024-05-27

## 2024-05-27 RX ORDER — SODIUM CHLORIDE, SODIUM LACTATE, POTASSIUM CHLORIDE, CALCIUM CHLORIDE 600; 310; 30; 20 MG/100ML; MG/100ML; MG/100ML; MG/100ML
125 INJECTION, SOLUTION INTRAVENOUS CONTINUOUS
Status: DISCONTINUED | OUTPATIENT
Start: 2024-05-27 | End: 2024-05-27 | Stop reason: HOSPADM

## 2024-05-27 RX ORDER — SILVER NITRATE 38.21; 12.74 MG/1; MG/1
STICK TOPICAL
Status: DISCONTINUED | OUTPATIENT
Start: 2024-05-27 | End: 2024-05-27 | Stop reason: HOSPADM

## 2024-05-27 RX ORDER — OXYCODONE HYDROCHLORIDE 5 MG/1
5 TABLET ORAL ONCE AS NEEDED
Status: DISCONTINUED | OUTPATIENT
Start: 2024-05-27 | End: 2024-05-27 | Stop reason: HOSPADM

## 2024-05-27 RX ORDER — SODIUM CHLORIDE 9 MG/ML
INJECTION, SOLUTION INTRAVENOUS CONTINUOUS
Status: DISCONTINUED | OUTPATIENT
Start: 2024-05-27 | End: 2024-05-27

## 2024-05-27 RX ORDER — LIDOCAINE HYDROCHLORIDE 20 MG/ML
INJECTION INTRAVENOUS
Status: DISCONTINUED | OUTPATIENT
Start: 2024-05-27 | End: 2024-05-27

## 2024-05-27 RX ORDER — OXYCODONE AND ACETAMINOPHEN 5; 325 MG/1; MG/1
1 TABLET ORAL EVERY 4 HOURS PRN
Qty: 4 TABLET | Refills: 0 | Status: SHIPPED | OUTPATIENT
Start: 2024-05-27

## 2024-05-27 RX ORDER — IBUPROFEN 800 MG/1
800 TABLET ORAL EVERY 8 HOURS PRN
Qty: 30 TABLET | Refills: 0 | Status: SHIPPED | OUTPATIENT
Start: 2024-05-27

## 2024-05-27 RX ORDER — FENTANYL CITRATE 50 UG/ML
INJECTION, SOLUTION INTRAMUSCULAR; INTRAVENOUS
Status: DISCONTINUED | OUTPATIENT
Start: 2024-05-27 | End: 2024-05-27

## 2024-05-27 RX ORDER — SODIUM CHLORIDE, SODIUM LACTATE, POTASSIUM CHLORIDE, CALCIUM CHLORIDE 600; 310; 30; 20 MG/100ML; MG/100ML; MG/100ML; MG/100ML
INJECTION, SOLUTION INTRAVENOUS CONTINUOUS
Status: DISCONTINUED | OUTPATIENT
Start: 2024-05-27 | End: 2024-05-27 | Stop reason: HOSPADM

## 2024-05-27 RX ORDER — FENTANYL CITRATE 50 UG/ML
25 INJECTION, SOLUTION INTRAMUSCULAR; INTRAVENOUS EVERY 5 MIN PRN
Status: DISCONTINUED | OUTPATIENT
Start: 2024-05-27 | End: 2024-05-27 | Stop reason: HOSPADM

## 2024-05-27 RX ORDER — ONDANSETRON HYDROCHLORIDE 2 MG/ML
INJECTION, SOLUTION INTRAVENOUS
Status: DISCONTINUED | OUTPATIENT
Start: 2024-05-27 | End: 2024-05-27

## 2024-05-27 RX ORDER — ONDANSETRON HYDROCHLORIDE 2 MG/ML
4 INJECTION, SOLUTION INTRAVENOUS ONCE AS NEEDED
Status: DISCONTINUED | OUTPATIENT
Start: 2024-05-27 | End: 2024-05-27 | Stop reason: HOSPADM

## 2024-05-27 RX ORDER — SODIUM CHLORIDE 0.9 G/100ML
IRRIGANT IRRIGATION
Status: DISCONTINUED | OUTPATIENT
Start: 2024-05-27 | End: 2024-05-27 | Stop reason: HOSPADM

## 2024-05-27 RX ADMIN — LIDOCAINE HYDROCHLORIDE 10 MG: 10 INJECTION, SOLUTION EPIDURAL; INFILTRATION; INTRACAUDAL; PERINEURAL at 07:05

## 2024-05-27 RX ADMIN — PROPOFOL 100 MG: 10 INJECTION, EMULSION INTRAVENOUS at 07:05

## 2024-05-27 RX ADMIN — KETOROLAC TROMETHAMINE 30 MG: 30 INJECTION, SOLUTION INTRAMUSCULAR; INTRAVENOUS at 08:05

## 2024-05-27 RX ADMIN — LIDOCAINE HYDROCHLORIDE 80 MG: 20 INJECTION, SOLUTION INTRAVENOUS at 07:05

## 2024-05-27 RX ADMIN — MIDAZOLAM 2 MG: 1 INJECTION INTRAMUSCULAR; INTRAVENOUS at 07:05

## 2024-05-27 RX ADMIN — DEXAMETHASONE SODIUM PHOSPHATE 4 MG: 4 INJECTION, SOLUTION INTRAMUSCULAR; INTRAVENOUS at 07:05

## 2024-05-27 RX ADMIN — SODIUM CHLORIDE, POTASSIUM CHLORIDE, SODIUM LACTATE AND CALCIUM CHLORIDE: 600; 310; 30; 20 INJECTION, SOLUTION INTRAVENOUS at 07:05

## 2024-05-27 RX ADMIN — ONDANSETRON 4 MG: 2 INJECTION, SOLUTION INTRAMUSCULAR; INTRAVENOUS at 07:05

## 2024-05-27 RX ADMIN — ACETAMINOPHEN 1000 MG: 10 INJECTION, SOLUTION INTRAVENOUS at 07:05

## 2024-05-27 RX ADMIN — FENTANYL CITRATE 25 MCG: 50 INJECTION, SOLUTION INTRAMUSCULAR; INTRAVENOUS at 07:05

## 2024-05-27 NOTE — ANESTHESIA PREPROCEDURE EVALUATION
05/27/2024  Tatyana Bello is a 59 y.o., female.    Anesthesia Evaluation    I have reviewed the Patient Summary Reports.     I have reviewed the Nursing Notes. I have reviewed the NPO Status.   I have reviewed the Medications.     Review of Systems  Anesthesia Hx:             Denies Family Hx of Anesthesia complications.    Denies Personal Hx of Anesthesia complications.                    Social:  Non-Smoker       Cardiovascular:  Cardiovascular Normal                                            Pulmonary:  Pulmonary Normal                       Renal/:   renal calculi               Hepatic/GI:  Hepatic/GI Normal                 OB/GYN/PEDS:  Postmenopausal bleeding           Neurological:  Neurology Normal                                      Endocrine:  Endocrine Normal                Physical Exam  General:  Well nourished       Airway/Jaw/Neck:  Airway Findings: Mouth Opening: Normal     Tongue: Normal      General Airway Assessment: Adult      Mallampati: III  Improves to II with phonation.  TM Distance: Normal, at least 6 cm                 Dental:  DENTAL FINDINGS: Normal     Chest/Lungs:  Chest/Lungs Clear      Heart/Vascular:  Heart Findings: Normal                              Anesthesia Plan  Type of Anesthesia, risks & benefits discussed:  Anesthesia Type:  general    Patient's Preference:   Plan Factors:          Intra-op Monitoring Plan: standard ASA monitors  Intra-op Monitoring Plan Comments:   Post Op Pain Control Plan: multimodal analgesia and IV/PO Opioids PRN  Post Op Pain Control Plan Comments:     Induction:   IV and Inhalation  Beta Blocker:  Patient is not currently on a Beta-Blocker (No further documentation required).       Informed Consent: Informed consent signed with the Patient and all parties understand the risks and agree with anesthesia plan.  All questions answered.   Anesthesia consent signed with patient.  ASA Score: 1     Day of Surgery Review of History & Physical: I have interviewed and examined the patient. I have reviewed the patient's H&P dated:  There are no significant changes.            Ready For Surgery From Anesthesia Perspective.             Physical Exam  General: Well nourished    Airway:  Mallampati: III / II  Mouth Opening: Normal  TM Distance: Normal, at least 6 cm  Tongue: Normal        Anesthesia Plan  Type of Anesthesia, risks & benefits discussed:    Anesthesia Type: general  Intra-op Monitoring Plan: standard ASA monitors  Post Op Pain Control Plan: multimodal analgesia and IV/PO Opioids PRN  Induction:  IV and Inhalation  Informed Consent: Informed consent signed with the Patient and all parties understand the risks and agree with anesthesia plan.  All questions answered.   ASA Score: 1  Day of Surgery Review of History & Physical: I have interviewed and examined the patient. I have reviewed the patient's H&P dated:     Ready For Surgery From Anesthesia Perspective.     .

## 2024-05-27 NOTE — ANESTHESIA PROCEDURE NOTES
Intubation    Date/Time: 5/27/2024 7:49 AM    Performed by: Oneal Cortés CRNA  Authorized by: Russ Zambrano MD    Intubation:     Induction:  Intravenous    Intubated:  Postinduction    Mask Ventilation:  Not attempted    Attempts:  1    Attempted By:  CRNA (Oneal Cortés)    Difficult Airway Encountered?: No      Complications:  None    Airway Device:  Supraglottic airway/LMA    Airway Device Size:  3.0    Style/Cuff Inflation:  Cuffed    Secured at:  The lips    Placement Verified By:  Capnometry    Complicating Factors:  None    Findings Post-Intubation:  BS equal bilateral and atraumatic/condition of teeth unchanged

## 2024-05-27 NOTE — INTERVAL H&P NOTE
The patient has been examined and the H&P has been reviewed:    I concur with the findings and no changes have occurred since H&P was written.    Surgery risks, benefits and alternative options discussed and understood by patient/family.    -to OR when team ready    MD RONALOD

## 2024-05-27 NOTE — TELEPHONE ENCOUNTER
TC/w pt. Successfully scheduled appt with Dr. Ramírez On 8/26 at 2:30 pm. Reason for visit is, 3 month follow up. Pt voiced understanding.

## 2024-05-27 NOTE — TELEPHONE ENCOUNTER
----- Message from Carolee Cohen sent at 5/27/2024 11:14 AM CDT -----  Contact: self  Type:  Pharmacy Calling to Clarify an RX    Name of Caller:  Linda  Pharmacy Name:  [facility]  Prescription Name:  needs diagnoses code   What do they need to clarify?:  oxyCODONE-acetaminophen (PERCOCET) 5-325 mg per tablet  Best Call Back Number:  [phone]  Additional Information:  thanks

## 2024-05-27 NOTE — DISCHARGE SUMMARY
DISCHARGE SUMMARY    HOSPITAL COURSE:  Tatyana Bello was brought to [facility] for further evaluation and management of a suspected intrauterine mass in the setting of postmenopausal / postcoital bleeding.  She underwent uncomplicated hysteroscopic resection of the mass.  Her recovery from the surgery was unremarkable, and she meets criteria to go home.    PENDING PATHOLOGY:  Intrauterine mass  Endometrial curetting    DISCHARGE MEDICATIONS:  Ibuprofen 800mg by mouth every 8 hours as needed  for mild to moderate pain  take with food to minimize stomach upset    Percocet 5/325 1 tablet by mouth every 4 hours as needed  for moderate to severe pain  do not drive or make important decisions while taking  likely to cause constipation  consider taking an over-the-counter stool softener or laxative to prevent constipation (docusate sodium or miralax)    PATIENT INSTRUCTIONS:  Take it easy and rest for 2 days.  Return to normal activity and work after 48 hours.  It's safe to use stairs, but depending on the extent of your surgery you might want to limit use to 1-2 times per day at first.  Eat a normal diet.  Drink plenty of water.  It's okay to shower, but no bathing, swimming, or soaking for 2 weeks.  It's normal to have vaginal bleeding for 1-2 weeks after this procedure.  The amount should be the same as or less than a normal period.  No strenuous activity, heavy housework, or lifting greater than 10 pounds for 1 week after the procedure.  No driving, operating heavy machinery, or making important decisions  for 24 hours after the procedure,  if you are taking narcotic pain pills (Percocet, Norco, etc.)  if your pain control doesn't allow it (Can you quickly slam on the brakes and twist to check your blind spot?)  Pelvic rest (no sex, no tampons, nothing in the vagina) for 2 weeks  Call or return for:  pain not controlled by your medications,  heavy bleeding,  fever (100.4 or higher),  foul-smelling vaginal  discharge  24/7 [facility] Nurse Advice Line: [phone]    FOLLOW-UP:   Someone from the office will contact you about scheduling a follow-up appointment with Dr. Caceres.

## 2024-05-27 NOTE — TRANSFER OF CARE
Anesthesia Transfer of Care Note    Patient: Tatyana Bello    Procedure(s) Performed: Procedure(s) (LRB):  HYSTEROSCOPY, WITH DILATION AND CURETTAGE OF UTERUS and Possible Removal of Abnormal Vaginal Tissue (N/A)    Patient location: PACU    Anesthesia Type: general    Transport from OR: Transported from OR on room air with adequate spontaneous ventilation    Post pain: adequate analgesia    Post assessment: no apparent anesthetic complications and tolerated procedure well    Post vital signs: stable    Level of consciousness: awake    Nausea/Vomiting: no nausea/vomiting    Complications: none    Transfer of care protocol was followed      Last vitals: Visit Vitals  BP (!) 150/78   Pulse 86   Temp 36.8 °C (98.2 °F) (Skin)   Resp 16   Ht 5' (1.524 m)   Wt 47.7 kg (105 lb 2.6 oz)   LMP  (LMP Unknown)   SpO2 99%   Breastfeeding No   BMI 20.54 kg/m²

## 2024-05-27 NOTE — ANESTHESIA POSTPROCEDURE EVALUATION
Anesthesia Post Evaluation    Patient: Tatyana Bello    Procedure(s) Performed: Procedure(s) (LRB):  HYSTEROSCOPY, WITH DILATION AND CURETTAGE OF UTERUS and Possible Removal of Abnormal Vaginal Tissue (N/A)    Final Anesthesia Type: general      Patient location during evaluation: PACU  Patient participation: Yes- Able to Participate  Level of consciousness: sedated and awake  Post-procedure vital signs: reviewed and stable  Pain management: adequate  Airway patency: patent    PONV status at discharge: No PONV  Anesthetic complications: no      Cardiovascular status: hypertensive, blood pressure returned to baseline and hemodynamically stable  Respiratory status: spontaneous ventilation  Hydration status: euvolemic  Follow-up not needed.              Vitals Value Taken Time   /87 05/27/24 0915   Temp  05/27/24 0918   Pulse 67 05/27/24 0918   Resp 20 05/27/24 0910   SpO2 98 % 05/27/24 0918   Vitals shown include unfiled device data.      No case tracking events are documented in the log.      Pain/Sergio Score: Sergio Score: 9 (5/27/2024  8:44 AM)

## 2024-05-27 NOTE — TELEPHONE ENCOUNTER
Spoke with Ky from pharmacy to give Dx code. Code was needed to submit for RX after surgery. Diagnoses code give, G89.18 (post operative pain). Pharmacy said their system allowed Rx to go through.

## 2024-05-27 NOTE — OP NOTE
OPERATIVE NOTE    05/27/2024    PREOP DX = postcoital, postmenopausal bleeding, suspected intrauterine mass    POSTOP DX = same    PROCEDURE = hysteroscopy with resection of intrauterine mass and endometrial curetting (D&C)    SURGEON = Mira Caceres MD    ASSISTANT = none    ANESTHESIA = general with LMA    COMPLICATIONS = none    EBL = minimal    FLUIDS = 900cc    UOP = voided before going to OR    SPECIMENS = intrauterine mass, endometrial curetting    DRAINS = none    FINDINGS = large intrauterine mass occupying most of the cavity with its base anterior-left    PROCEDURE IN DETAIL:  After informed consent was obtained, the patient was taken to the operating room where anesthesia (general with LMA) was administered and found to be adequate.  A surgical time-out was performed, ensuring the proper patient, procedure, and site.  The vagina was prepped with chlorhexidine, and the patient was draped in the high lithotomy position using Jakub stirrups.    A speculum was placed in the vagina with visualization of the cervix.  A tenaculum was placed to the anterior lip of the cervix.  The cervix was serially dilated using Bita dilators to accommodate the hysteroscope.  The hysteroscope was passed into the uterine cavity using normal saline as the distention medium, set to an intrauterine pressure of 80mmHg.  The large intrauterine mass was readily seen.  Both tubal ostia were readily identified.  The mass was resected using the Myosure Reach device.  Time of resection was 2:36 minutes.  Fluid deficit was 215cc.    Sharp curettage was performed with minimal return of endometrial tissue.  All instruments were removed from the vagina.  Silver nitrate was applied to the tenaculum puncture sites on the cervix for hemostasis.  The patient's anesthesia was reversed uneventfully.  She tolerated the procedure well and was taken to PACU in stable condition for recovery.    MD RONALDO

## 2024-05-28 VITALS
HEIGHT: 60 IN | WEIGHT: 105.19 LBS | BODY MASS INDEX: 20.65 KG/M2 | DIASTOLIC BLOOD PRESSURE: 85 MMHG | OXYGEN SATURATION: 98 % | HEART RATE: 64 BPM | RESPIRATION RATE: 20 BRPM | SYSTOLIC BLOOD PRESSURE: 136 MMHG | TEMPERATURE: 98 F

## 2024-05-28 NOTE — PROGRESS NOTES
Good afternoon,    Great news that the pathology report from your surgery is all benign - no signs of cancer or precancer.  The mass inside your uterus was a fibroid, which is a very common tumor of the uterus.  It is not cancerous.  I'll see you in August for follow-up, and we can chat more then.    Sincerely,  Dr. Caceres

## 2024-05-31 LAB
OHS QRS DURATION: 68 MS
OHS QTC CALCULATION: 414 MS

## 2024-06-02 DIAGNOSIS — F51.04 CHRONIC INSOMNIA: ICD-10-CM

## 2024-06-03 RX ORDER — TRAZODONE HYDROCHLORIDE 100 MG/1
100 TABLET ORAL NIGHTLY
Qty: 30 TABLET | Refills: 0 | Status: SHIPPED | OUTPATIENT
Start: 2024-06-03

## 2024-07-05 DIAGNOSIS — F51.04 CHRONIC INSOMNIA: ICD-10-CM

## 2024-07-08 RX ORDER — TRAZODONE HYDROCHLORIDE 100 MG/1
100 TABLET ORAL NIGHTLY
Qty: 30 TABLET | Refills: 0 | Status: SHIPPED | OUTPATIENT
Start: 2024-07-08

## 2024-08-01 DIAGNOSIS — F51.04 CHRONIC INSOMNIA: ICD-10-CM

## 2024-08-02 RX ORDER — TRAZODONE HYDROCHLORIDE 100 MG/1
100 TABLET ORAL NIGHTLY
Qty: 30 TABLET | Refills: 0 | Status: SHIPPED | OUTPATIENT
Start: 2024-08-02

## 2024-08-06 DIAGNOSIS — F51.04 CHRONIC INSOMNIA: ICD-10-CM

## 2024-08-08 RX ORDER — ZOLPIDEM TARTRATE 5 MG/1
5 TABLET ORAL NIGHTLY
Qty: 30 TABLET | Refills: 1 | Status: SHIPPED | OUTPATIENT
Start: 2024-08-08 | End: 2024-10-07

## 2024-08-26 ENCOUNTER — OFFICE VISIT (OUTPATIENT)
Dept: OBSTETRICS AND GYNECOLOGY | Facility: CLINIC | Age: 59
End: 2024-08-26
Payer: MEDICAID

## 2024-08-26 ENCOUNTER — OFFICE VISIT (OUTPATIENT)
Dept: FAMILY MEDICINE | Facility: CLINIC | Age: 59
End: 2024-08-26
Payer: MEDICAID

## 2024-08-26 VITALS
SYSTOLIC BLOOD PRESSURE: 128 MMHG | RESPIRATION RATE: 14 BRPM | BODY MASS INDEX: 20.26 KG/M2 | HEIGHT: 60 IN | DIASTOLIC BLOOD PRESSURE: 72 MMHG | WEIGHT: 103.19 LBS

## 2024-08-26 VITALS
BODY MASS INDEX: 20.15 KG/M2 | TEMPERATURE: 98 F | HEART RATE: 80 BPM | RESPIRATION RATE: 20 BRPM | HEIGHT: 60 IN | WEIGHT: 102.63 LBS | SYSTOLIC BLOOD PRESSURE: 110 MMHG | DIASTOLIC BLOOD PRESSURE: 70 MMHG

## 2024-08-26 DIAGNOSIS — Z12.31 SCREENING MAMMOGRAM FOR BREAST CANCER: ICD-10-CM

## 2024-08-26 DIAGNOSIS — F51.04 CHRONIC INSOMNIA: Primary | ICD-10-CM

## 2024-08-26 DIAGNOSIS — N95.2 VAGINAL ATROPHY: ICD-10-CM

## 2024-08-26 DIAGNOSIS — N93.0 PCB (POST COITAL BLEEDING): ICD-10-CM

## 2024-08-26 DIAGNOSIS — R79.89 LOW SERUM CALCIUM: ICD-10-CM

## 2024-08-26 DIAGNOSIS — R31.29 MICROSCOPIC HEMATURIA: ICD-10-CM

## 2024-08-26 DIAGNOSIS — N93.0 PCB (POST COITAL BLEEDING): Primary | ICD-10-CM

## 2024-08-26 PROCEDURE — 1159F MED LIST DOCD IN RCRD: CPT | Mod: CPTII,,, | Performed by: GENERAL PRACTICE

## 2024-08-26 PROCEDURE — 99999 PR PBB SHADOW E&M-EST. PATIENT-LVL IV: CPT | Mod: PBBFAC,,, | Performed by: NURSE PRACTITIONER

## 2024-08-26 PROCEDURE — 3074F SYST BP LT 130 MM HG: CPT | Mod: CPTII,,, | Performed by: NURSE PRACTITIONER

## 2024-08-26 PROCEDURE — 3008F BODY MASS INDEX DOCD: CPT | Mod: CPTII,,, | Performed by: NURSE PRACTITIONER

## 2024-08-26 PROCEDURE — 3078F DIAST BP <80 MM HG: CPT | Mod: CPTII,,, | Performed by: NURSE PRACTITIONER

## 2024-08-26 PROCEDURE — 99213 OFFICE O/P EST LOW 20 MIN: CPT | Mod: PBBFAC,PO | Performed by: GENERAL PRACTICE

## 2024-08-26 PROCEDURE — 99213 OFFICE O/P EST LOW 20 MIN: CPT | Mod: S$PBB,,, | Performed by: NURSE PRACTITIONER

## 2024-08-26 PROCEDURE — 99214 OFFICE O/P EST MOD 30 MIN: CPT | Mod: PBBFAC,27,PN | Performed by: NURSE PRACTITIONER

## 2024-08-26 PROCEDURE — 99213 OFFICE O/P EST LOW 20 MIN: CPT | Mod: S$PBB,,, | Performed by: GENERAL PRACTICE

## 2024-08-26 PROCEDURE — 3008F BODY MASS INDEX DOCD: CPT | Mod: CPTII,,, | Performed by: GENERAL PRACTICE

## 2024-08-26 PROCEDURE — 3074F SYST BP LT 130 MM HG: CPT | Mod: CPTII,,, | Performed by: GENERAL PRACTICE

## 2024-08-26 PROCEDURE — 3078F DIAST BP <80 MM HG: CPT | Mod: CPTII,,, | Performed by: GENERAL PRACTICE

## 2024-08-26 PROCEDURE — 99999 PR PBB SHADOW E&M-EST. PATIENT-LVL III: CPT | Mod: PBBFAC,,, | Performed by: GENERAL PRACTICE

## 2024-08-26 RX ORDER — ESTRADIOL 10 UG/1
INSERT VAGINAL
Qty: 14 TABLET | Refills: 0 | Status: SHIPPED | OUTPATIENT
Start: 2024-08-26

## 2024-08-26 RX ORDER — ZOLPIDEM TARTRATE 5 MG/1
5 TABLET ORAL NIGHTLY
Qty: 30 TABLET | Refills: 1 | Status: SHIPPED | OUTPATIENT
Start: 2024-08-26 | End: 2024-10-25

## 2024-08-26 RX ORDER — ESTRADIOL 10 UG/1
INSERT VAGINAL
Qty: 24 TABLET | Refills: 3 | Status: SHIPPED | OUTPATIENT
Start: 2024-08-26

## 2024-08-26 RX ORDER — PHENTERMINE HYDROCHLORIDE 37.5 MG/1
37.5 TABLET ORAL EVERY MORNING
COMMUNITY
Start: 2024-08-13 | End: 2024-08-26

## 2024-08-26 NOTE — PROGRESS NOTES
Patient ID: Tatyana Bello is a 59 y.o. female.    Chief Complaint: Follow-up (60 yo female here for 6 month recheck. Pt states no c/o. KM)    Ms. Bello presents today for follow up. She states she is doing well overall at the time of this visit. She has follow up scheduled with Dr. Caceres today as well. She denies any new issues at this time. She is in need of medication refills as well.     Follow-up  Pertinent negatives include no arthralgias, chest pain, headaches, joint swelling, neck pain, vomiting or weakness.     Past Medical History:   Diagnosis Date    History of cardiac arrhythmia     pt states had tachy-arrhythmia 12 yrs ago; treated with cardiac ablation    Insomnia     Microscopic hematuria     Post-menopausal bleeding     Uterine mass     Wears contact lenses      Past Surgical History:   Procedure Laterality Date    BREAST BIOPSY Right     CARDIAC ELECTROPHYSIOLOGY STUDY AND ABLATION      DIAGNOSTIC LAPAROSCOPY N/A 12/19/2018    Procedure: LAPAROSCOPY, DIAGNOSTIC;  Surgeon: Christy Garcia MD;  Location: [facility] OR;  Service: OB/GYN;  Laterality: N/A;    HYSTEROSCOPY WITH DILATION AND CURETTAGE OF UTERUS N/A 5/27/2024    Procedure: HYSTEROSCOPY, WITH DILATION AND CURETTAGE OF UTERUS;  Surgeon: Mira Caceres MD;  Location: [facility] OR;  Service: OB/GYN;  Laterality: N/A;  cutting time 2:36  deficit 215ml         Tobacco History:  reports that she has quit smoking. She has been exposed to tobacco smoke. She has never used smokeless tobacco.      Review of patient's allergies indicates:  No Known Allergies    Current Outpatient Medications:     ammonium lactate (LAC-HYDRIN) 12 % lotion, Apply topically., Disp: , Rfl:     estrogen, conjugated,-medroxyprogesterone 0.3-1.5 mg (PREMPRO) 0.3-1.5 mg per tablet, Prempro 0.3 mg-1.5 mg tablet  Take 1 tablet every day by oral route., Disp: 90 tablet, Rfl: 3    ibuprofen (ADVIL,MOTRIN) 800 MG tablet, Take 1 tablet (800 mg total) by mouth every 8 (eight)  hours as needed for Pain. (Patient not taking: Reported on 8/26/2024), Disp: 30 tablet, Rfl: 0    oxyCODONE-acetaminophen (PERCOCET) 5-325 mg per tablet, Take 1 tablet by mouth every 4 (four) hours as needed for Pain. (Patient not taking: Reported on 8/26/2024), Disp: 4 tablet, Rfl: 0    RETIN-A 0.1 % cream, , Disp: , Rfl:     traZODone (DESYREL) 100 MG tablet, TAKE 1 TABLET BY MOUTH ONCE DAILY IN THE EVENING, Disp: 30 tablet, Rfl: 0    triamcinolone acetonide 0.1% (KENALOG) 0.1 % cream, Apply topically 2 (two) times daily., Disp: , Rfl:     estradioL (VAGIFEM) 10 mcg Tab, Insert one tablet in the vagina every night for 2 weeks for initiation therapy., Disp: 14 tablet, Rfl: 0    estradioL (VAGIFEM) 10 mcg Tab, Insert one tablet vaginally twice a week., Disp: 24 tablet, Rfl: 3    zolpidem (AMBIEN) 5 MG Tab, Take 1 tablet (5 mg total) by mouth every evening., Disp: 30 tablet, Rfl: 1    Review of Systems   Constitutional:  Negative for activity change and unexpected weight change.   HENT:  Negative for hearing loss, rhinorrhea and trouble swallowing.    Eyes:  Negative for discharge and visual disturbance.   Respiratory:  Negative for chest tightness and wheezing.    Cardiovascular:  Negative for chest pain and palpitations.   Gastrointestinal:  Negative for blood in stool, constipation, diarrhea and vomiting.   Endocrine: Negative for polydipsia and polyuria.   Genitourinary:  Negative for difficulty urinating, dysuria, hematuria and menstrual problem.   Musculoskeletal:  Negative for arthralgias, joint swelling and neck pain.   Neurological:  Negative for weakness and headaches.   Psychiatric/Behavioral:  Negative for confusion and dysphoric mood.           Objective:      Vitals:    08/26/24 1317   BP: 110/70   Pulse: 80   Resp: 20   Temp: 97.8 °F (36.6 °C)   TempSrc: Oral   Weight: 46.5 kg (102 lb 9.6 oz)   Height: 5' (1.524 m)     Physical Exam  Constitutional:       Appearance: Normal appearance.    Cardiovascular:      Rate and Rhythm: Normal rate and regular rhythm.      Heart sounds: Normal heart sounds.   Pulmonary:      Effort: Pulmonary effort is normal.      Breath sounds: Normal breath sounds.   Abdominal:      General: Bowel sounds are normal.      Palpations: Abdomen is soft.   Musculoskeletal:         General: Normal range of motion.   Neurological:      Mental Status: She is alert and oriented to person, place, and time. Mental status is at baseline.   Psychiatric:         Mood and Affect: Mood normal.         Behavior: Behavior normal.           Assessment:       1. Chronic insomnia    2. PCB (post coital bleeding)    3. Microscopic hematuria    4. Low serum calcium           Plan:       Chronic insomnia  -     zolpidem (AMBIEN) 5 MG Tab; Take 1 tablet (5 mg total) by mouth every evening.  Dispense: 30 tablet; Refill: 1    PCB (post coital bleeding)  -     Urinalysis, Reflex to Urine Culture Urine, Clean Catch    Microscopic hematuria  -     Urinalysis, Reflex to Urine Culture Urine, Clean Catch  -     CBC W/ AUTO DIFFERENTIAL; Future; Expected date: 08/26/2024    Low serum calcium  -     COMPREHENSIVE METABOLIC PANEL; Future; Expected date: 08/26/2024      Follow up in about 3 months (around 11/26/2024).        8/26/2024 Funmilayo Soto NP      Answers submitted by the patient for this visit:  Review of Systems Questionnaire (Submitted on 8/25/2024)  activity change: No  unexpected weight change: No  neck pain: No  hearing loss: No  rhinorrhea: No  trouble swallowing: No  eye discharge: No  visual disturbance: No  chest tightness: No  wheezing: No  chest pain: No  palpitations: No  blood in stool: No  constipation: No  vomiting: No  diarrhea: No  polydipsia: No  polyuria: No  difficulty urinating: No  hematuria: No  menstrual problem: No  dysuria: No  joint swelling: No  arthralgias: No  headaches: No  weakness: No  confusion: No  dysphoric mood: No

## 2024-08-26 NOTE — PROGRESS NOTES
SUBJECTIVE   04/15/2024  Tatyana Bello is a 58 y.o. here for evaluation of postcoital bleeding.  Bleeding every time she has sex.  For 1-2 yrs.  Bright red, small amount.  Doesn't really happen outside of having sex.  Forgets Prempro sometimes - notices that can make it worse.  Saw Urology in [place] OCT 2023.  Last note I see from Urology is 2018.    2024  Tatyana Bello is a 59yo post-menopausal female here for f/u after recent pelvic US.  I first saw her 4/15/24 with complaint of postcoital bleeding that's been going on for 1-2yrs.  --> Hx D&C on 24 with large intrauterine mass resected    2024  Tatyana Bello is a 59 y.o. here for f/u after surgery in MAY.  Bleeding after sex is much better though does still happen sometimes.  She thinks it might be from vaginal dryness or small tears.  Uses lubricant for sex but still feels dry.    G'sP's:   LMP: unsure when menopause happened - transitioned from DEMETRIA to HRT  Relationship: sexually active and boyfriend x 8yrs (prior divorce)  Contraception: menopause  PAP / HPV (MAR 2023) = ASCUS / neg  PAP / HPV (MAR 2022) = neg / neg  HRT: Prempro >2yrs  MMG + US (OCT 2023) = neg --> 1yr f/u       OBJECTIVE   Vitals:    24 1428   BP: 128/72   Resp: 14       GEN = alert/oriented, nad, pleasant  HEENT = sclera anicteric, EOM grossly normal  CV = BP and HR as per vitals  PULM = normal respiratory effort    LABS & RADS     PELVIC US (2024) =  Uterus 6 x 4 x 5 cm  EMS 16 --> 6 mm  ROV 2 x 1 x 1 cm  LOV 2 x 1 x 1 cm  Other: [id number] solid nodule within endometrial canal causing EM to be thickened to 16mm in this area    SURGICAL PATHOLOGY 24  1. ENDOMETRIAL CURETTINGS: - FRAGMENTS OF BENIGN INACTIVE ENDOMETRIUM AND STRIPS OF BENIGN SQUAMOUS EPITHELIUM.   2. VAGINAL (should be intra-uterine) MASS: - LEIOMYOMA AND FRAGMENTS OF INACTIVE ENDOMETRIUM.        ASSESSMENT / PLAN   59 y.o. for 3-month f/u after Hx D&C for AUB.  Benign  pathology (fibroid).  Still having some vaginal spotting after sex, likely d/t atrophy.    Rx: Vagifem    next cervical CA screen due MAR 2026    Mammogram: ordered and patient will schedule    RTC in MAR 2025 for JOSE, natalie HIGGINS MD  -----------------------      PAST MEDICAL HISTORY  Abnormal heart rhythm - patient reported  Hematuria - seen by Urology    PAST SURGICAL HISTORY  Past Surgical History:   Procedure Laterality Date    BREAST BIOPSY Right     CARDIAC ELECTROPHYSIOLOGY STUDY AND ABLATION      DIAGNOSTIC LAPAROSCOPY N/A 2018    Procedure: LAPAROSCOPY, DIAGNOSTIC;  Surgeon: Christy Garcia MD;  Location: [facility] OR;  Service: OB/GYN;  Laterality: N/A;     ALLERGIES  Review of patient's allergies indicates:  No Known Allergies    MEDICATIONS  Current Outpatient Medications   Medication Instructions    ammonium lactate (LAC-HYDRIN) 12 % lotion Apply topically.    estrogen, conjugated,-medroxyprogesterone 0.3-1.5 mg (PREMPRO) 0.3-1.5 mg per tablet Prempro 0.3 mg-1.5 mg tablet  Take 1 tablet every day by oral route.    RETIN-A 0.1 % cream SMARTSIG:sparingly Topical Every Night    traZODone (DESYREL) 100 mg, Oral, Nightly    triamcinolone acetonide 0.1% (KENALOG) 0.1 % cream Topical (Top), 2 times daily    zolpidem (AMBIEN) 5 mg, Oral, Nightly       GYNECOLOGIC HISTORY  PAP'S: no h/o abnormals  Genital HSV: no  Other STI'S: no past history    Menarche: 14 yoa  Non-menstrual pelvic pressure/pain: No    Vaginal dryness: yes    OBSTETRIC HISTORY  Living children: 1  Vaginal deliveries: 1    SOCIAL HISTORY  Lives with: boyfriend  Smoker: former smoker  Alcohol: 2 drinks a day  Drugs: denies  Domestic Violence: no  Occupation:     FAMILY HISTORY  BREAST/UTERINE/OVARIAN CANCER: sister  34 of breast CA  COLON CA: sister (different sister)  BLEEDING DISORDERS: none  CLOTTING DISORDERS: none    --------------------------------------------------------    OPERATIVE NOTE     2024      PREOP DX = postcoital, postmenopausal bleeding, suspected intrauterine mass     POSTOP DX = same     PROCEDURE = hysteroscopy with resection of intrauterine mass and endometrial curetting (D&C)     SURGEON = Mira Caceres MD     ASSISTANT = none     ANESTHESIA = general with LMA     COMPLICATIONS = none     EBL = minimal     FLUIDS = 900cc     UOP = voided before going to OR     SPECIMENS = intrauterine mass, endometrial curetting     DRAINS = none     FINDINGS = large intrauterine mass occupying most of the cavity with its base anterior-left     PROCEDURE IN DETAIL:  After informed consent was obtained, the patient was taken to the operating room where anesthesia (general with LMA) was administered and found to be adequate.  A surgical time-out was performed, ensuring the proper patient, procedure, and site.  The vagina was prepped with chlorhexidine, and the patient was draped in the high lithotomy position using Jakub stirrups.     A speculum was placed in the vagina with visualization of the cervix.  A tenaculum was placed to the anterior lip of the cervix.  The cervix was serially dilated using Bita dilators to accommodate the hysteroscope.  The hysteroscope was passed into the uterine cavity using normal saline as the distention medium, set to an intrauterine pressure of 80mmHg.  The large intrauterine mass was readily seen.  Both tubal ostia were readily identified.  The mass was resected using the Myosure Reach device.  Time of resection was 2:36 minutes.  Fluid deficit was 215cc.     Sharp curettage was performed with minimal return of endometrial tissue.  All instruments were removed from the vagina.  Silver nitrate was applied to the tenaculum puncture sites on the cervix for hemostasis.  The patient's anesthesia was reversed uneventfully.  She tolerated the procedure well and was taken to PACU in stable condition for recovery.     MARTINEZ CACERES

## 2024-08-30 DIAGNOSIS — F51.04 CHRONIC INSOMNIA: ICD-10-CM

## 2024-08-30 RX ORDER — TRAZODONE HYDROCHLORIDE 100 MG/1
100 TABLET ORAL NIGHTLY
Qty: 30 TABLET | Refills: 0 | Status: SHIPPED | OUTPATIENT
Start: 2024-08-30

## 2024-09-26 DIAGNOSIS — F51.04 CHRONIC INSOMNIA: ICD-10-CM

## 2024-09-26 RX ORDER — TRAZODONE HYDROCHLORIDE 100 MG/1
100 TABLET ORAL NIGHTLY
Qty: 30 TABLET | Refills: 0 | Status: SHIPPED | OUTPATIENT
Start: 2024-09-26

## 2024-10-03 ENCOUNTER — HOSPITAL ENCOUNTER (EMERGENCY)
Facility: HOSPITAL | Age: 59
Discharge: HOME | End: 2024-10-03
Attending: EMERGENCY MEDICINE
Payer: MEDICAID

## 2024-10-03 VITALS
BODY MASS INDEX: 21.37 KG/M2 | DIASTOLIC BLOOD PRESSURE: 75 MMHG | TEMPERATURE: 98 F | SYSTOLIC BLOOD PRESSURE: 133 MMHG | RESPIRATION RATE: 15 BRPM | WEIGHT: 106 LBS | HEART RATE: 82 BPM | HEIGHT: 59 IN | OXYGEN SATURATION: 99 %

## 2024-10-03 DIAGNOSIS — M79.602 LEFT ARM PAIN: ICD-10-CM

## 2024-10-03 LAB
ANION GAP SERPL CALC-SCNC: 7 MMOL/L (ref 8–16)
BASOPHILS # BLD AUTO: 0.04 K/UL (ref 0–0.2)
BASOPHILS NFR BLD: 0.7 % (ref 0–1.9)
BUN SERPL-MCNC: 9 MG/DL (ref 6–20)
CALCIUM SERPL-MCNC: 8.8 MG/DL (ref 8.7–10.5)
CHLORIDE SERPL-SCNC: 107 MMOL/L (ref 95–110)
CO2 SERPL-SCNC: 27 MMOL/L (ref 23–29)
CREAT SERPL-MCNC: 0.8 MG/DL (ref 0.5–1.4)
DIFFERENTIAL METHOD BLD: ABNORMAL
EOSINOPHIL # BLD AUTO: 0.2 K/UL (ref 0–0.5)
EOSINOPHIL NFR BLD: 2.7 % (ref 0–8)
ERYTHROCYTE [DISTWIDTH] IN BLOOD BY AUTOMATED COUNT: 11.8 % (ref 11.5–14.5)
EST. GFR  (NO RACE VARIABLE): >60 ML/MIN/1.73 M^2
GLUCOSE SERPL-MCNC: 99 MG/DL (ref 70–110)
HCT VFR BLD AUTO: 37.1 % (ref 37–48.5)
HGB BLD-MCNC: 12.2 G/DL (ref 12–16)
IMM GRANULOCYTES # BLD AUTO: 0.02 K/UL (ref 0–0.04)
IMM GRANULOCYTES NFR BLD AUTO: 0.4 % (ref 0–0.5)
LYMPHOCYTES # BLD AUTO: 2 K/UL (ref 1–4.8)
LYMPHOCYTES NFR BLD: 35.2 % (ref 18–48)
MCH RBC QN AUTO: 31.9 PG (ref 27–31)
MCHC RBC AUTO-ENTMCNC: 32.9 G/DL (ref 32–36)
MCV RBC AUTO: 97 FL (ref 82–98)
MONOCYTES # BLD AUTO: 0.4 K/UL (ref 0.3–1)
MONOCYTES NFR BLD: 7.1 % (ref 4–15)
NEUTROPHILS # BLD AUTO: 3.1 K/UL (ref 1.8–7.7)
NEUTROPHILS NFR BLD: 53.9 % (ref 38–73)
NRBC BLD-RTO: 0 /100 WBC
PLATELET # BLD AUTO: 225 K/UL (ref 150–450)
PMV BLD AUTO: 10.1 FL (ref 9.2–12.9)
POTASSIUM SERPL-SCNC: 3.3 MMOL/L (ref 3.5–5.1)
RBC # BLD AUTO: 3.82 M/UL (ref 4–5.4)
SODIUM SERPL-SCNC: 141 MMOL/L (ref 136–145)
TROPONIN I SERPL HS-MCNC: <2.3 PG/ML (ref 0–14.9)
WBC # BLD AUTO: 5.65 K/UL (ref 3.9–12.7)

## 2024-10-03 PROCEDURE — 93005 ELECTROCARDIOGRAM TRACING: CPT | Performed by: INTERNAL MEDICINE

## 2024-10-03 PROCEDURE — 99284 EMERGENCY DEPT VISIT MOD MDM: CPT | Mod: 25

## 2024-10-03 PROCEDURE — 25000003 PHARM REV CODE 250: Performed by: EMERGENCY MEDICINE

## 2024-10-03 PROCEDURE — 85025 COMPLETE CBC W/AUTO DIFF WBC: CPT | Performed by: EMERGENCY MEDICINE

## 2024-10-03 PROCEDURE — 84484 ASSAY OF TROPONIN QUANT: CPT | Performed by: EMERGENCY MEDICINE

## 2024-10-03 PROCEDURE — 80048 BASIC METABOLIC PNL TOTAL CA: CPT | Performed by: EMERGENCY MEDICINE

## 2024-10-03 PROCEDURE — 93010 ELECTROCARDIOGRAM REPORT: CPT | Mod: ,,, | Performed by: INTERNAL MEDICINE

## 2024-10-03 RX ADMIN — POTASSIUM BICARBONATE 20 MEQ: 782 TABLET, EFFERVESCENT ORAL at 01:10

## 2024-10-03 NOTE — ED PROVIDER NOTES
Chief complaint:  Arm Pain (Left upper)      HPI:  Tatyana Bello is a 59 y.o. female post-menopausal on estradiol presenting with transient atraumatic left humeral arm pain described as a burning sensation several hours ago now resolved.  Patient is specifically concerned to make sure this is not a heart problem.  She denies any exertional component or associated chest pain or dyspnea.  No associated diaphoresis or emesis.  Pain when present was not associated with any particular activity or position.  There was no radiation or migration of pain.  She felt it primarily in her humeral region.  She denies other numbness or weakness.  No associated headache.  No difficulty speaking or swallowing.  No difficulty walking.    ROS: As per HPI and below:  No abdominal pain, blood in the stools, fever, cough, fall or arm injury, swelling, rash.    Review of patient's allergies indicates:  No Known Allergies    Discharge Medication List as of 10/3/2024  1:51 AM        CONTINUE these medications which have NOT CHANGED    Details   ammonium lactate (LAC-HYDRIN) 12 % lotion Apply topically., Starting Wed 2/21/2024, Historical Med      !! estradioL (VAGIFEM) 10 mcg Tab Insert one tablet in the vagina every night for 2 weeks for initiation therapy., Normal      !! estradioL (VAGIFEM) 10 mcg Tab Insert one tablet vaginally twice a week., Normal      estrogen, conjugated,-medroxyprogesterone 0.3-1.5 mg (PREMPRO) 0.3-1.5 mg per tablet Prempro 0.3 mg-1.5 mg tablet  Take 1 tablet every day by oral route., Normal      ibuprofen (ADVIL,MOTRIN) 800 MG tablet Take 1 tablet (800 mg total) by mouth every 8 (eight) hours as needed for Pain., Starting Mon 5/27/2024, Normal      oxyCODONE-acetaminophen (PERCOCET) 5-325 mg per tablet Take 1 tablet by mouth every 4 (four) hours as needed for Pain., Starting Mon 5/27/2024, Normal      RETIN-A 0.1 % cream Historical Med      traZODone (DESYREL) 100 MG tablet TAKE 1 TABLET BY MOUTH ONCE DAILY IN  THE EVENING, Starting Thu 9/26/2024, Normal      triamcinolone acetonide 0.1% (KENALOG) 0.1 % cream Apply topically 2 (two) times daily., Starting Tue 12/27/2022, Historical Med      zolpidem (AMBIEN) 5 MG Tab Take 1 tablet (5 mg total) by mouth every evening., Starting Mon 8/26/2024, Until Fri 10/25/2024, Normal       !! - Potential duplicate medications found. Please discuss with provider.          PMH:  As per HPI and below:  Past Medical History:   Diagnosis Date    History of cardiac arrhythmia     pt states had tachy-arrhythmia 12 yrs ago; treated with cardiac ablation    Insomnia     Microscopic hematuria     Post-menopausal bleeding     Uterine mass     Wears contact lenses      Past Surgical History:   Procedure Laterality Date    BREAST BIOPSY Right     CARDIAC ELECTROPHYSIOLOGY STUDY AND ABLATION      DIAGNOSTIC LAPAROSCOPY N/A 12/19/2018    Procedure: LAPAROSCOPY, DIAGNOSTIC;  Surgeon: Christy Garcia MD;  Location: [facility] OR;  Service: OB/GYN;  Laterality: N/A;    HYSTEROSCOPY WITH DILATION AND CURETTAGE OF UTERUS N/A 5/27/2024    Procedure: HYSTEROSCOPY, WITH DILATION AND CURETTAGE OF UTERUS;  Surgeon: Mira Caceres MD;  Location: [facility] OR;  Service: OB/GYN;  Laterality: N/A;  cutting time 2:36  deficit 215ml       Social History     Socioeconomic History    Marital status:     Number of children: 1   Tobacco Use    Smoking status: Former     Passive exposure: Past    Smokeless tobacco: Never   Substance and Sexual Activity    Alcohol use: Yes    Drug use: No    Sexual activity: Yes     Partners: Male     Social Drivers of Health     Financial Resource Strain: Low Risk  (1/12/2024)    Overall Financial Resource Strain (CARDIA)     Difficulty of Paying Living Expenses: Not very hard   Food Insecurity: No Food Insecurity (1/12/2024)    Hunger Vital Sign     Worried About Running Out of Food in the Last Year: Never true     Ran Out of Food in the Last Year: Never true   Transportation  Needs: Unknown (1/12/2024)    PRAPARE - Transportation     Lack of Transportation (Medical): Patient declined     Lack of Transportation (Non-Medical): No   Physical Activity: Unknown (1/12/2024)    Exercise Vital Sign     Days of Exercise per Week: 0 days   Stress: No Stress Concern Present (1/12/2024)    [employer] - Occupational Stress Questionnaire     Feeling of Stress : Only a little   Housing Stability: Low Risk  (1/12/2024)    Housing Stability Vital Sign     Unable to Pay for Housing in the Last Year: No     Number of Places Lived in the Last Year: 1     Unstable Housing in the Last Year: No       Family History   Problem Relation Name Age of Onset    Stroke Father      Breast cancer Sister      Bone cancer Sister      Colon cancer Brother      Diabetes Mother      Hypertension Mother      Stroke Maternal Grandmother      Stroke Maternal Grandfather         Physical Exam:    Vitals:    10/03/24 0200   BP: 133/75   Pulse: 82   Resp: 15   Temp:      GENERAL:  No apparent distress.  Alert.    HEENT:  Moist mucous membranes.  Normocephalic and atraumatic.    NECK:  No swelling.  Midline trachea.   CARDIOVASCULAR:  Regular rate and rhythm.  2+ radial pulses.  No murmur.  PULMONARY:  Lungs clear to auscultation bilaterally.  No wheezes, rales, or rhonci.    ABDOMEN:  Non-tender and non-distended.    EXTREMITIES:  Warm and well perfused.  Brisk capillary refill.  No reproducible tenderness to palpation in the arm.  No deformity, edema, erythema, crepitus, limitation to full active range of motion in the shoulder, elbow, or wrist on the left.  No limb asymmetry.  NEUROLOGICAL:  Normal mental status.  Appropriate and conversant.  5/5 strength with equal sensation light touch in the upper lower extremities bilaterally.  Cranial nerves 3-12 intact.  SKIN:  No rashes or ecchymoses.      Labs Reviewed   CBC W/ AUTO DIFFERENTIAL - Abnormal       Result Value    WBC 5.65      RBC 3.82 (*)      Hemoglobin 12.2      Hematocrit 37.1      MCV 97      MCH 31.9 (*)     MCHC 32.9      RDW 11.8      Platelets 225      MPV 10.1      Immature Granulocytes 0.4      Gran # (ANC) 3.1      Immature Grans (Abs) 0.02      Lymph # 2.0      Mono # 0.4      Eos # 0.2      Baso # 0.04      nRBC 0      Gran % 53.9      Lymph % 35.2      Mono % 7.1      Eosinophil % 2.7      Basophil % 0.7      Differential Method Automated     BASIC METABOLIC PANEL - Abnormal    Sodium 141      Potassium 3.3 (*)     Chloride 107      CO2 27      Glucose 99      BUN 9      Creatinine 0.8      Calcium 8.8      Anion Gap 7 (*)     eGFR >60.0     TROPONIN I HIGH SENSITIVITY    Troponin I High Sensitivity <2.3         Discharge Medication List as of 10/3/2024  1:51 AM        CONTINUE these medications which have NOT CHANGED    Details   ammonium lactate (LAC-HYDRIN) 12 % lotion Apply topically., Starting Wed 2/21/2024, Historical Med      !! estradioL (VAGIFEM) 10 mcg Tab Insert one tablet in the vagina every night for 2 weeks for initiation therapy., Normal      !! estradioL (VAGIFEM) 10 mcg Tab Insert one tablet vaginally twice a week., Normal      estrogen, conjugated,-medroxyprogesterone 0.3-1.5 mg (PREMPRO) 0.3-1.5 mg per tablet Prempro 0.3 mg-1.5 mg tablet  Take 1 tablet every day by oral route., Normal      ibuprofen (ADVIL,MOTRIN) 800 MG tablet Take 1 tablet (800 mg total) by mouth every 8 (eight) hours as needed for Pain., Starting Mon 5/27/2024, Normal      oxyCODONE-acetaminophen (PERCOCET) 5-325 mg per tablet Take 1 tablet by mouth every 4 (four) hours as needed for Pain., Starting Mon 5/27/2024, Normal      RETIN-A 0.1 % cream Historical Med      traZODone (DESYREL) 100 MG tablet TAKE 1 TABLET BY MOUTH ONCE DAILY IN THE EVENING, Starting Thu 9/26/2024, Normal      triamcinolone acetonide 0.1% (KENALOG) 0.1 % cream Apply topically 2 (two) times daily., Starting Tue 12/27/2022, Historical Med      zolpidem (AMBIEN) 5 MG Tab Take 1  tablet (5 mg total) by mouth every evening., Starting Mon 8/26/2024, Until Fri 10/25/2024, Normal       !! - Potential duplicate medications found. Please discuss with provider.          Orders Placed This Encounter   Procedures    CBC auto differential    Basic metabolic panel (BMP)    Troponin I High Sensitivity    EKG 12-lead       Imaging Results    None         ED Course as of 10/03/24 0538   Thu Oct 03, 2024   0107 EKG:  NSR, rate of 79, normal intervals and axis.  Isolated T-wave inversion in V2 without no consecutive pathologic inversion or significant ST elevation or depression.  No sign of acute ischemia. [MR]      ED Course User Index  [MR] Lino Gibbons MD       MDM:    59 y.o. female with transient burning arm pain now resolved.  Low suspicion for coronary equivalent.  Possible benign neuropathic type pain.  No history of trauma or reproducible tenderness here.  I doubt fracture or dislocation.  There is no edema or tenderness and I doubt DVT.  There are excellent distal radial pulses and cap refill and I doubt acute arterial ischemia.  I do not think further x-ray would be beneficial.  Symptoms have resolved.  EKG and cardiac biomarker sent for symptoms resolved hours ago.  I do not think serial repeat or inpatient cardiac monitoring is indicated.  She is appropriate for PCP follow-up.  I have very low suspicion for central neurologic etiologies such as CVA.  I do not think brain imaging is indicated.  I doubt TIA.  Follow up with PCP.  Return precautions reviewed.    Diagnoses:    1. Left arm pain       Lino Gibbons MD  10/03/24 0538

## 2024-10-04 LAB
OHS QRS DURATION: 76 MS
OHS QTC CALCULATION: 438 MS

## 2024-11-08 ENCOUNTER — HOSPITAL ENCOUNTER (OUTPATIENT)
Dept: RADIOLOGY | Facility: HOSPITAL | Age: 59
Discharge: HOME | End: 2024-11-08
Attending: GENERAL PRACTICE
Payer: MEDICAID

## 2024-11-08 DIAGNOSIS — Z12.31 SCREENING MAMMOGRAM FOR BREAST CANCER: ICD-10-CM

## 2024-11-08 DIAGNOSIS — N93.0 POSTCOITAL BLEEDING: Primary | ICD-10-CM

## 2024-11-08 DIAGNOSIS — R31.29 MICROSCOPIC HEMATURIA: ICD-10-CM

## 2024-11-08 PROCEDURE — 77063 BREAST TOMOSYNTHESIS BI: CPT | Mod: 26,,, | Performed by: RADIOLOGY

## 2024-11-08 PROCEDURE — 77067 SCR MAMMO BI INCL CAD: CPT | Mod: TC,PO

## 2024-11-08 PROCEDURE — 77067 SCR MAMMO BI INCL CAD: CPT | Mod: 26,,, | Performed by: RADIOLOGY

## 2024-11-11 ENCOUNTER — TELEPHONE (OUTPATIENT)
Dept: RADIOLOGY | Facility: HOSPITAL | Age: 59
End: 2024-11-11

## 2024-11-25 ENCOUNTER — HOSPITAL ENCOUNTER (OUTPATIENT)
Dept: RADIOLOGY | Facility: HOSPITAL | Age: 59
Discharge: HOME | End: 2024-11-25
Attending: GENERAL PRACTICE
Payer: MEDICAID

## 2024-11-25 DIAGNOSIS — R92.8 ABNORMAL MAMMOGRAM: ICD-10-CM

## 2024-11-25 PROCEDURE — 76642 ULTRASOUND BREAST LIMITED: CPT | Mod: 26,LT,, | Performed by: RADIOLOGY

## 2024-11-25 PROCEDURE — 76642 ULTRASOUND BREAST LIMITED: CPT | Mod: TC,PO,LT

## 2024-11-26 ENCOUNTER — OFFICE VISIT (OUTPATIENT)
Dept: FAMILY MEDICINE | Facility: CLINIC | Age: 59
End: 2024-11-26
Payer: MEDICAID

## 2024-11-26 VITALS
BODY MASS INDEX: 20.52 KG/M2 | DIASTOLIC BLOOD PRESSURE: 70 MMHG | HEART RATE: 76 BPM | SYSTOLIC BLOOD PRESSURE: 130 MMHG | TEMPERATURE: 98 F | WEIGHT: 101.81 LBS | HEIGHT: 59 IN | RESPIRATION RATE: 20 BRPM

## 2024-11-26 DIAGNOSIS — D64.9 ANEMIA, UNSPECIFIED TYPE: Primary | ICD-10-CM

## 2024-11-26 DIAGNOSIS — F51.04 CHRONIC INSOMNIA: ICD-10-CM

## 2024-11-26 PROCEDURE — 3008F BODY MASS INDEX DOCD: CPT | Mod: CPTII,,, | Performed by: NURSE PRACTITIONER

## 2024-11-26 PROCEDURE — 99213 OFFICE O/P EST LOW 20 MIN: CPT | Mod: S$PBB,,, | Performed by: NURSE PRACTITIONER

## 2024-11-26 PROCEDURE — 99213 OFFICE O/P EST LOW 20 MIN: CPT | Mod: PBBFAC,PN | Performed by: NURSE PRACTITIONER

## 2024-11-26 PROCEDURE — 3078F DIAST BP <80 MM HG: CPT | Mod: CPTII,,, | Performed by: NURSE PRACTITIONER

## 2024-11-26 PROCEDURE — 1159F MED LIST DOCD IN RCRD: CPT | Mod: CPTII,,, | Performed by: NURSE PRACTITIONER

## 2024-11-26 PROCEDURE — 99999 PR PBB SHADOW E&M-EST. PATIENT-LVL III: CPT | Mod: PBBFAC,,, | Performed by: NURSE PRACTITIONER

## 2024-11-26 PROCEDURE — 3075F SYST BP GE 130 - 139MM HG: CPT | Mod: CPTII,,, | Performed by: NURSE PRACTITIONER

## 2024-11-26 RX ORDER — TRAZODONE HYDROCHLORIDE 100 MG/1
100 TABLET ORAL NIGHTLY
Qty: 30 TABLET | Refills: 2 | Status: SHIPPED | OUTPATIENT
Start: 2024-11-26

## 2024-11-26 RX ORDER — ZOLPIDEM TARTRATE 5 MG/1
5 TABLET ORAL NIGHTLY
Qty: 30 TABLET | Refills: 2 | Status: SHIPPED | OUTPATIENT
Start: 2024-11-26 | End: 2025-02-24

## 2024-11-26 NOTE — PROGRESS NOTES
" Patient ID: Tatyana Bello is a 59 y.o. female.    Chief Complaint: Follow-up (58 yo female here for 3 month recheck and lab consult. KM)    History of Present Illness    CHIEF COMPLAINT:  Ms. Bello presents for a follow-up visit to discuss recent test results, including an ultrasound and labs.    HPI:  Ms. Bello visited an emergency room in [place] for left arm pain approximately 1 month ago. An EKG and labs were performed during that visit. An ultrasound ordered by Dr. Caceres yesterday revealed bilateral simple cysts, determined to be benign. Recent labs showed slightly low calcium levels at 8.7, just below the normal range. The liver enzymes were low, interpreted as a sign of good hydration. The RBC count was slightly low, with macrocytic cells, consistent with previous lab results. The emergency room visit included a normal troponin test, ruling out a heart attack. At that time, the patient's potassium was slightly low. Recent urine tests showed improved hematuria (from 3+ to 1+). Ms. Bello had undergone surgery with Dr. Chavez for uterine cysts, potentially related to the hematuria.    Ms. Bello denies chest pain and any current symptoms or complaints related to the cysts or labs results.    MEDICATIONS:  Ms. Bello is on Ambien as needed for sleep, though not taken daily. She is also on Trazodone daily for sleep.    MEDICAL HISTORY:  Ms. Bello has a history of benign bilateral simple cysts and macrocytic anemia. She underwent an ultrasound yesterday, which showed benign bilateral simple cysts. Ms. Bello has a daughter.    SURGICAL HISTORY:  Ms. Bello underwent uterine cyst removal performed by Dr. Chavez. The indication for the surgery was the presence of uterine cysts.    TEST RESULTS:  About a month ago, the patient's calcium level was low at 8.7, which was attributed to dietary factors. Her liver enzymes were low, described as "benign lows". The RBC count was slightly low, while " hemoglobin was normal. MCV and MCH were slightly elevated, consistent with previous labs. During an ER visit about a month ago, troponin was normal and potassium was slightly low, though it normalized in the most recent labs. A urine test 8 months ago showed 3+ blood, which improved to 1+ blood in the most recent test. Ms. Bello underwent an EKG about a month ago during an ER visit.    IMAGING:  Yesterday, the patient had a breast ultrasound which revealed bilateral simple cysts, determined to be benign.    SOCIAL HISTORY:  Ms. Bello frequently consumes beer. She is .      ROS:  General: -fever, -chills, -fatigue, -weight gain, -weight loss  Eyes: -vision changes, -redness, -discharge  ENT: -ear pain, -nasal congestion, -sore throat  Cardiovascular: -chest pain, -palpitations, -lower extremity edema  Respiratory: -cough, -shortness of breath  Gastrointestinal: -abdominal pain, -nausea, -vomiting, -diarrhea, -constipation, -blood in stool  Genitourinary: -dysuria, -hematuria, -frequency  Musculoskeletal: +joint pain, -muscle pain  Skin: -rash, -lesion  Neurological: -headache, -dizziness, -numbness, -tingling  Psychiatric: -anxiety, -depression, -sleep difficulty             Past Medical History:   Diagnosis Date    History of cardiac arrhythmia     pt states had tachy-arrhythmia 12 yrs ago; treated with cardiac ablation    Insomnia     Microscopic hematuria     Post-menopausal bleeding     Uterine mass     Wears contact lenses      Past Surgical History:   Procedure Laterality Date    BREAST BIOPSY Right     CARDIAC ELECTROPHYSIOLOGY STUDY AND ABLATION      DIAGNOSTIC LAPAROSCOPY N/A 12/19/2018    Procedure: LAPAROSCOPY, DIAGNOSTIC;  Surgeon: Christy Garcia MD;  Location: [facility];  Service: OB/GYN;  Laterality: N/A;    HYSTEROSCOPY WITH DILATION AND CURETTAGE OF UTERUS N/A 5/27/2024    Procedure: HYSTEROSCOPY, WITH DILATION AND CURETTAGE OF UTERUS;  Surgeon: Mira Caceres MD;  Location: [facility]  "OR;  Service: OB/GYN;  Laterality: N/A;  cutting time 2:36  deficit 215ml         Tobacco History:  reports that she has quit smoking. She has been exposed to tobacco smoke. She has never used smokeless tobacco.      Review of patient's allergies indicates:  No Known Allergies    Current Outpatient Medications:     ammonium lactate (LAC-HYDRIN) 12 % lotion, Apply topically., Disp: , Rfl:     estradioL (VAGIFEM) 10 mcg Tab, Insert one tablet in the vagina every night for 2 weeks for initiation therapy., Disp: 14 tablet, Rfl: 0    estrogen, conjugated,-medroxyprogesterone 0.3-1.5 mg (PREMPRO) 0.3-1.5 mg per tablet, Prempro 0.3 mg-1.5 mg tablet  Take 1 tablet every day by oral route., Disp: 90 tablet, Rfl: 3    ibuprofen (ADVIL,MOTRIN) 800 MG tablet, Take 1 tablet (800 mg total) by mouth every 8 (eight) hours as needed for Pain., Disp: 30 tablet, Rfl: 0    oxyCODONE-acetaminophen (PERCOCET) 5-325 mg per tablet, Take 1 tablet by mouth every 4 (four) hours as needed for Pain., Disp: 4 tablet, Rfl: 0    RETIN-A 0.1 % cream, , Disp: , Rfl:     triamcinolone acetonide 0.1% (KENALOG) 0.1 % cream, Apply topically 2 (two) times daily., Disp: , Rfl:     traZODone (DESYREL) 100 MG tablet, Take 1 tablet (100 mg total) by mouth every evening., Disp: 30 tablet, Rfl: 2    zolpidem (AMBIEN) 5 MG Tab, Take 1 tablet (5 mg total) by mouth every evening., Disp: 30 tablet, Rfl: 2           Objective:      Vitals:    11/26/24 1517   BP: 130/70   Pulse: 76   Resp: 20   Temp: 97.8 °F (36.6 °C)   TempSrc: Oral   Weight: 46.2 kg (101 lb 12.8 oz)   Height: 4' 11.02" (1.499 m)     Physical Exam  Constitutional:       Appearance: Normal appearance.   Cardiovascular:      Rate and Rhythm: Normal rate and regular rhythm.      Heart sounds: Normal heart sounds.   Pulmonary:      Effort: Pulmonary effort is normal.      Breath sounds: Normal breath sounds.   Abdominal:      General: Bowel sounds are normal.      Palpations: Abdomen is soft. "   Musculoskeletal:         General: Normal range of motion.   Neurological:      Mental Status: She is alert and oriented to person, place, and time. Mental status is at baseline.   Psychiatric:         Mood and Affect: Mood normal.         Behavior: Behavior normal.           Assessment:       1. Anemia, unspecified type    2. Chronic insomnia           Plan:       Assessment & Plan    Reviewed recent emergency room visit for left arm pain; EKG and labs performed  Assessed ultrasound results showing benign bilateral simple cysts  Evaluated recent labs:  - Noted low calcium (8.7), likely dietary  - Liver enzymes within normal range  - Noted slightly low RBC count with elevated MCV and MCH, consistent with previous labs  - Troponin normal, ruling out recent myocardial infarction  - Potassium normalized in recent labs  Reviewed urinalysis showing slight improvement in blood presence (from 3+ to 1+)  Considered macrocytic anemia possibly related to alcohol consumption    DIFFUSE CYSTIC MASTOPATHY:  - Explained significance of simple cysts being benign and not requiring removal.    NUTRITIONAL ANEMIA:  - Explained macrocytic anemia and its potential relation to alcohol intake.  - Educated on the meaning of various blood test results, including RBC measurements.  - Recommend increasing red meat consumption to address potential anemia.    INSOMNIA:  - Refilled Ambien for 3 months, to be taken as needed.  - Continued Trazodone at current dose for daily use.    HYPERLIPIDEMIA:  - Discussed dietary sources of calcium, including milk and calcium-rich vegetables.  - Ms. Bello to increase calcium intake through diet (e.g., milk, calcium-rich vegetables).    ALCOHOL-RELATED ISSUES:  - Clarified liver enzyme results in relation to alcohol consumption.  - Ms. Bello to balance alcohol consumption.    FOLLOW-UP:  - Follow up in 3 months for sleep medication refill and to recheck labs.         Anemia, unspecified type  -     CBC  W/ AUTO DIFFERENTIAL; Future; Expected date: 05/26/2025    Chronic insomnia  -     zolpidem (AMBIEN) 5 MG Tab; Take 1 tablet (5 mg total) by mouth every evening.  Dispense: 30 tablet; Refill: 2  -     COMPREHENSIVE METABOLIC PANEL; Future; Expected date: 05/26/2025  -     traZODone (DESYREL) 100 MG tablet; Take 1 tablet (100 mg total) by mouth every evening.  Dispense: 30 tablet; Refill: 2      Follow up in about 6 months (around 5/26/2025), or if symptoms worsen or fail to improve.        11/29/2024 Funmilayo Soto NP    This note was generated with the assistance of ambient listening technology. Verbal consent was obtained by the patient and accompanying visitor(s) for the recording of patient appointment to facilitate this note. I attest to having reviewed and edited the generated note for accuracy, though some syntax or spelling errors may persist. Please contact the author of this note for any clarification.

## 2025-02-21 DIAGNOSIS — F51.04 CHRONIC INSOMNIA: ICD-10-CM

## 2025-02-21 RX ORDER — TRAZODONE HYDROCHLORIDE 100 MG/1
100 TABLET ORAL NIGHTLY
Qty: 30 TABLET | Refills: 0 | Status: SHIPPED | OUTPATIENT
Start: 2025-02-21

## 2025-03-23 NOTE — PROGRESS NOTES
"  Background   04/15/2024  Tatyana Bello is a 58 y.o. here for evaluation of postcoital bleeding, every time she has sex.  For 1-2 yrs.  Bright red, small amount.  Doesn't really happen outside of having sex.  Forgets Prempro sometimes - notices that can make it worse.     2024  Tatyana Bello is a 59yo post-menopausal female here for f/u after recent pelvic US.    24  Hx D&C with large intrauterine mass resected     2024  Tatyana Bello is a 59 y.o. here for f/u after surgery in MAY.  Bleeding after sex is much better though does still happen sometimes.  She thinks it might be from vaginal dryness or small tears.  Uses lubricant for sex but still feels dry.  --> Rx Vagfiem     ASSESSMENT AND PLAN   2025  59 y.o.  for [employer].      Rads: pelvic US.  Consider EMBx versus change to PremPhase pending results.  Cervical Cancer screening: f/u results of PAP / HPV --> if both negative then next screen in 5 yrs  Mammogram: up to date  Encouraged self breast awareness; RTC for breast concerns  Return to clinic: for annual GYN check-up, sooner prn    Leslie L Weeks, MD [facility]    SUBJECTIVE   2025  Tatyana Bello is a 59 y.o. here for [employer].  Not really bleeding after sex any more- vagifem is helping.  However every 3 weeks or so is having bleeding for ~ 2 days, very light - pantyliner only.  Having less sex lately in general, just due to age.  No changes to HRT regimen.    G'sP's:   LMP: unsure when menopause happened - transitioned from DEMETRIA to HRT   Relationship: sexually active and boyfriend x 8yrs (prior divorce)   PAP Hx: see "Dysplasia History" section of this note  MMG (24) = also had US done; negative exams  HRT: currently taking: PremPro 0.3 / 1.5   BLEEDING: yes as per HPI     OBJECTIVE   PHYSICAL EXAM  Vitals:    25 1600   BP: 116/68     GEN = alert/oriented, nad, pleasant  HEENT = sclera anicteric, EOM grossly normal  BREASTS = " nontender, no suspicious masses palpated, no suspicious skin changes, no nipple discharge, scar noted right breast from prior biopsy  CV = BP and HR as per vitals  PULM = normal respiratory effort   =      External: nefg, mildly atrophic     Vagina: no lesions, mildly atrophied, urethral meatus normal     Discharge: minimal     Cervix: normal-appearing, PAP smear obtained     Bimanual: uterus normal size and nontender, no adnexal masses or tenderness    LABS AND RADS    PELVIC US (2024) =  Uterus 6 x 4 x 5 cm  EMS 16 --> 6 mm  ROV 2 x 1 x 1 cm  LOV 2 x 1 x 1 cm  Other: [id number] solid nodule within endometrial canal causing EM to be thickened to 16mm in this area     SURGICAL PATHOLOGY 24  1. ENDOMETRIAL CURETTINGS: - FRAGMENTS OF BENIGN INACTIVE ENDOMETRIUM AND STRIPS OF BENIGN SQUAMOUS EPITHELIUM.   2. VAGINAL (should be intra-uterine) MASS: - LEIOMYOMA AND FRAGMENTS OF INACTIVE ENDOMETRIUM.     HISTORY   Date verified: 2025     GYNECOLOGIC HISTORY  DYSPLASIA HISTORY:  3/28/2023: ASCUS / HPV neg   3/1/2022: PAP neg / HPV neg    Genital HSV: No  Other STD Hx: No    Menarche: 14yoa  Pain -- Non-menstrual pelvic pain: No / Dyspareunia: denies  Other -- Vasomotor Sxs: denies / Vaginal dryness: yes, better on Vagifem    OBSTETRIC HISTORY  Living children: 1  Vaginal deliveries: 1    SOCIAL HISTORY  Lives with: BF  Smoker: former smoker / Alcohol:  couple drinks a day  / Drugs: denies  Domestic Violence: No  Occupation:      FAMILY HISTORY  BLEEDING or CLOTTING DISORDERS: none  BREAST CA: sister  age 34 of breast CA  / UTERINE CA: none / OVARIAN CA: none  COLON CA: sister (different sister)    PAST MEDICAL HISTORY  -------------------------------------    History of cardiac arrhythmia    pt states had tachy-arrhythmia 12 yrs ago; treated with cardiac ablation    Insomnia    Microscopic hematuria    Post-menopausal bleeding    Uterine mass    Wears contact lenses     PAST SURGICAL  HISTORY  ----------------------------    Breast biopsy    Cardiac electrophysiology study and ablation    Diagnostic laparoscopy    Procedure: LAPAROSCOPY, DIAGNOSTIC;  Surgeon: Christy Garcia MD;  Location: [facility] OR;  Service: OB/GYN;  Laterality: N/A;    Hysteroscopy with dilation and curettage of uterus    Procedure: HYSTEROSCOPY, WITH DILATION AND CURETTAGE OF UTERUS;  Surgeon: Mira Caceres MD;  Location: [facility] OR;  Service: OB/GYN;  Laterality: N/A;  cutting time 2:36  deficit 215ml     ALLERGIES  Review of patient's allergies indicates:  No Known Allergies    MEDICATIONS  Current Outpatient Medications   Medication Instructions    ammonium lactate (LAC-HYDRIN) 12 % lotion Apply topically.    estradioL (VAGIFEM) 10 mcg Tab Insert one tablet in the vagina every night for 2 weeks for initiation therapy.    estrogen, conjugated,-medroxyprogesterone 0.3-1.5 mg (PREMPRO) 0.3-1.5 mg per tablet Prempro 0.3 mg-1.5 mg tablet  Take 1 tablet every day by oral route.    ibuprofen (ADVIL,MOTRIN) 800 mg, Oral, Every 8 hours PRN    oxyCODONE-acetaminophen (PERCOCET) 5-325 mg per tablet 1 tablet, Oral, Every 4 hours PRN    RETIN-A 0.1 % cream     traZODone (DESYREL) 100 mg, Oral, Nightly    triamcinolone acetonide 0.1% (KENALOG) 0.1 % cream 2 times daily    zolpidem (AMBIEN) 5 mg, Oral, Nightly

## 2025-03-24 ENCOUNTER — OFFICE VISIT (OUTPATIENT)
Dept: OBSTETRICS AND GYNECOLOGY | Facility: CLINIC | Age: 60
End: 2025-03-24
Payer: MEDICAID

## 2025-03-24 VITALS
DIASTOLIC BLOOD PRESSURE: 68 MMHG | WEIGHT: 110.13 LBS | BODY MASS INDEX: 22.2 KG/M2 | SYSTOLIC BLOOD PRESSURE: 116 MMHG | HEIGHT: 59 IN

## 2025-03-24 DIAGNOSIS — N95.0 PMB (POSTMENOPAUSAL BLEEDING): ICD-10-CM

## 2025-03-24 DIAGNOSIS — Z12.4 CERVICAL CANCER SCREENING: ICD-10-CM

## 2025-03-24 DIAGNOSIS — Z01.419 WELL WOMAN EXAM: Primary | ICD-10-CM

## 2025-03-24 PROCEDURE — 99213 OFFICE O/P EST LOW 20 MIN: CPT | Mod: PBBFAC,PO | Performed by: GENERAL PRACTICE

## 2025-03-24 PROCEDURE — 1159F MED LIST DOCD IN RCRD: CPT | Mod: CPTII,,, | Performed by: GENERAL PRACTICE

## 2025-03-24 PROCEDURE — 3008F BODY MASS INDEX DOCD: CPT | Mod: CPTII,,, | Performed by: GENERAL PRACTICE

## 2025-03-24 PROCEDURE — 3074F SYST BP LT 130 MM HG: CPT | Mod: CPTII,,, | Performed by: GENERAL PRACTICE

## 2025-03-24 PROCEDURE — 99999 PR PBB SHADOW E&M-EST. PATIENT-LVL III: CPT | Mod: PBBFAC,,, | Performed by: GENERAL PRACTICE

## 2025-03-24 PROCEDURE — 99396 PREV VISIT EST AGE 40-64: CPT | Mod: S$PBB,,, | Performed by: GENERAL PRACTICE

## 2025-03-24 PROCEDURE — 3078F DIAST BP <80 MM HG: CPT | Mod: CPTII,,, | Performed by: GENERAL PRACTICE

## 2025-03-24 PROCEDURE — 88175 CYTOPATH C/V AUTO FLUID REDO: CPT | Mod: TC | Performed by: GENERAL PRACTICE

## 2025-03-24 NOTE — PATIENT INSTRUCTIONS
PAP SMEARS:    Screening for cervical cancer involves 1 or 2 parts based on your age and situation:  PAP smear (looking at the cells from your cervix)  HPV testing (checking whether you have the Human Papilloma Virus in your cervical cells)    These test results often come back at different times, but you won't hear from me until they BOTH are back since both pieces of information are important in deciding what to do next.    Soif you see a PAP result in [employer] (or an HPV result) that is abnormal, please allow another 7-10 business days before you send a message asking what to do next.  I am waiting for the other test result before I make a recommendation.    If both tests are resulted in [facility], you should hear from me within 2-3 business days.    Abnormal tests will be followed up in one of two ways:  Repeat testing of PAP and/or HPV  Colposcopy (examination and biopsy of your cervix in the office using staining solutions and magnification)     ULTRASOUND, MAMMOGRAMS, CT SCANS:  If the treatment plan is simple or unchanged based on the study result, I'll probably send a message in [facility].    If the treatment plan is complex and/or the study result is concerning, I'll call you to discuss.  In some cases, I'll have my staff schedule an appointment for you to come in to discuss things in person.

## 2025-03-26 ENCOUNTER — HOSPITAL ENCOUNTER (OUTPATIENT)
Dept: RADIOLOGY | Facility: HOSPITAL | Age: 60
Discharge: HOME | End: 2025-03-26
Attending: GENERAL PRACTICE
Payer: MEDICAID

## 2025-03-26 DIAGNOSIS — Z01.419 WELL WOMAN EXAM: ICD-10-CM

## 2025-03-26 DIAGNOSIS — N95.0 PMB (POSTMENOPAUSAL BLEEDING): ICD-10-CM

## 2025-03-26 PROCEDURE — 76856 US EXAM PELVIC COMPLETE: CPT | Mod: 26,,, | Performed by: RADIOLOGY

## 2025-03-26 PROCEDURE — 76830 TRANSVAGINAL US NON-OB: CPT | Mod: TC,PO

## 2025-03-26 PROCEDURE — 76830 TRANSVAGINAL US NON-OB: CPT | Mod: 26,,, | Performed by: RADIOLOGY

## 2025-03-27 LAB
INSULIN SERPL-ACNC: NORMAL U[IU]/ML
LAB AP BETHESDA CATEGORY: NORMAL
LAB AP CLINICAL FINDINGS: NORMAL
LAB AP CONTRACEPTIVES: NORMAL
LAB AP OCHS PAP SPECIMEN ADEQUACY: NORMAL
LAB AP OHS PAP INTERPRETATION: NORMAL
LAB AP PAP DISCLAIMER COMMENTS: NORMAL
LAB AP PAP ESTROGEN REPLACEMENT THERAPY: NORMAL
LAB AP PAP PMP: NORMAL
LAB AP PAP PREVIOUS BX: NORMAL
LAB AP PAP PRIOR TREATMENT: NORMAL
LAB AP PERFORMING LOCATION(S): NORMAL

## 2025-04-16 DIAGNOSIS — F51.04 CHRONIC INSOMNIA: ICD-10-CM

## 2025-04-16 RX ORDER — TRAZODONE HYDROCHLORIDE 100 MG/1
100 TABLET ORAL NIGHTLY
Qty: 30 TABLET | Refills: 0 | Status: SHIPPED | OUTPATIENT
Start: 2025-04-16

## 2025-04-23 ENCOUNTER — TELEPHONE (OUTPATIENT)
Dept: OBSTETRICS AND GYNECOLOGY | Facility: CLINIC | Age: 60
End: 2025-04-23
Payer: MEDICAID

## 2025-04-23 ENCOUNTER — RESULTS FOLLOW-UP (OUTPATIENT)
Dept: OBSTETRICS AND GYNECOLOGY | Facility: CLINIC | Age: 60
End: 2025-04-23

## 2025-04-23 NOTE — TELEPHONE ENCOUNTER
Contacted pt to schedule appt with Dr. Salmon to discuss hysterectomy. Appt scheduled for 6/2/25 @ 4:00 pm. Pt verbalized understanding.

## 2025-04-23 NOTE — PROGRESS NOTES
Team, please help get her scheduled to see Dr. Salmon to discuss possible hysterectomy.  Non-urgent (ie don't triple-book hiim).    ----------------    Spoke to patient on the phone this morning.  Informed her that there is another intrauterine mass one year after having had a hysteroscopic resection of a fibroid.  She is postmenopausal and continues to have intermittent bleeding.  In light of these findings and symptoms, I recommend she at least have the mass biopsied / removed but also consider a hysterectomy for more definitive evaluation and management.  She voiced understanding and agreement.    Mira Cacrees MD

## 2025-04-23 NOTE — TELEPHONE ENCOUNTER
----- Message from Mira Caceres MD sent at 4/23/2025  8:04 AM CDT -----  Team, please help get her scheduled to see Dr. Salmon to discuss possible hysterectomy.  Non-urgent (ie don't triple-book hiim).    ----------------    Spoke to patient on the phone this morning.  Informed her that there is another intrauterine mass one year after having had a hysteroscopic resection of a fibroid.  She is postmenopausal and   continues to have intermittent bleeding.  In light of these findings and symptoms, I recommend she at least have the mass biopsied / removed but also consider a hysterectomy for more definitive   evaluation and management.  She voiced understanding and agreement.    Mira Caceres MD    ----- Message -----  From: Michele Rad Results In  Sent: 3/26/2025   3:26 PM CDT  To: Mira Caceres MD

## 2025-05-12 ENCOUNTER — LAB VISIT (OUTPATIENT)
Dept: LAB | Facility: HOSPITAL | Age: 60
End: 2025-05-12
Attending: NURSE PRACTITIONER
Payer: MEDICAID

## 2025-05-12 DIAGNOSIS — D64.9 ANEMIA, UNSPECIFIED TYPE: ICD-10-CM

## 2025-05-12 DIAGNOSIS — F51.04 CHRONIC INSOMNIA: ICD-10-CM

## 2025-05-12 LAB
ABSOLUTE EOSINOPHIL (SMH): 0.08 K/UL
ABSOLUTE MONOCYTE (SMH): 0.54 K/UL (ref 0.3–1)
ABSOLUTE NEUTROPHIL COUNT (SMH): 2.8 K/UL (ref 1.8–7.7)
ALBUMIN SERPL-MCNC: 4.2 G/DL (ref 3.5–5.2)
ALP SERPL-CCNC: 59 UNIT/L (ref 55–135)
ALT SERPL-CCNC: 9 UNIT/L (ref 10–44)
ANION GAP (SMH): 7 MMOL/L (ref 8–16)
AST SERPL-CCNC: 22 UNIT/L (ref 10–40)
BASOPHILS # BLD AUTO: 0.05 K/UL
BASOPHILS NFR BLD AUTO: 0.9 %
BILIRUB SERPL-MCNC: 0.7 MG/DL (ref 0.1–1)
BUN SERPL-MCNC: 17 MG/DL (ref 6–20)
CALCIUM SERPL-MCNC: 9 MG/DL (ref 8.7–10.5)
CHLORIDE SERPL-SCNC: 106 MMOL/L (ref 95–110)
CO2 SERPL-SCNC: 27 MMOL/L (ref 23–29)
CREAT SERPL-MCNC: 0.7 MG/DL (ref 0.5–1.4)
ERYTHROCYTE [DISTWIDTH] IN BLOOD BY AUTOMATED COUNT: 12.2 % (ref 11.5–14.5)
GFR SERPLBLD CREATININE-BSD FMLA CKD-EPI: >60 ML/MIN/1.73/M2
GLUCOSE SERPL-MCNC: 100 MG/DL (ref 70–110)
HCT VFR BLD AUTO: 38.6 % (ref 37–48.5)
HGB BLD-MCNC: 12.3 GM/DL (ref 12–16)
IMM GRANULOCYTES # BLD AUTO: 0.02 K/UL (ref 0–0.04)
IMM GRANULOCYTES NFR BLD AUTO: 0.4 % (ref 0–0.5)
LYMPHOCYTES # BLD AUTO: 1.98 K/UL (ref 1–4.8)
MCH RBC QN AUTO: 31.9 PG (ref 27–31)
MCHC RBC AUTO-ENTMCNC: 31.9 G/DL (ref 32–36)
MCV RBC AUTO: 100 FL (ref 82–98)
NUCLEATED RBC (/100WBC) (SMH): 0 /100 WBC
PLATELET # BLD AUTO: 203 K/UL (ref 150–450)
PMV BLD AUTO: 10.6 FL (ref 9.2–12.9)
POTASSIUM SERPL-SCNC: 4.2 MMOL/L (ref 3.5–5.1)
PROT SERPL-MCNC: 7 GM/DL (ref 6–8.4)
RBC # BLD AUTO: 3.86 M/UL (ref 4–5.4)
RELATIVE EOSINOPHIL (SMH): 1.5 % (ref 0–8)
RELATIVE LYMPHOCYTE (SMH): 36.5 % (ref 18–48)
RELATIVE MONOCYTE (SMH): 10 % (ref 4–15)
RELATIVE NEUTROPHIL (SMH): 50.7 % (ref 38–73)
SODIUM SERPL-SCNC: 140 MMOL/L (ref 136–145)
WBC # BLD AUTO: 5.42 K/UL (ref 3.9–12.7)

## 2025-05-12 PROCEDURE — 82040 ASSAY OF SERUM ALBUMIN: CPT

## 2025-05-12 PROCEDURE — 36415 COLL VENOUS BLD VENIPUNCTURE: CPT | Mod: PO

## 2025-05-12 PROCEDURE — 85025 COMPLETE CBC W/AUTO DIFF WBC: CPT

## 2025-05-14 DIAGNOSIS — F51.04 CHRONIC INSOMNIA: ICD-10-CM

## 2025-05-14 RX ORDER — TRAZODONE HYDROCHLORIDE 100 MG/1
100 TABLET ORAL NIGHTLY
Qty: 30 TABLET | Refills: 0 | Status: SHIPPED | OUTPATIENT
Start: 2025-05-14

## 2025-05-23 RX ORDER — PHENTERMINE HYDROCHLORIDE 37.5 MG/1
37.5 TABLET ORAL EVERY MORNING
COMMUNITY
Start: 2025-03-22

## 2025-05-26 ENCOUNTER — OFFICE VISIT (OUTPATIENT)
Dept: FAMILY MEDICINE | Facility: CLINIC | Age: 60
End: 2025-05-26
Payer: MEDICAID

## 2025-05-26 VITALS
SYSTOLIC BLOOD PRESSURE: 132 MMHG | HEART RATE: 78 BPM | BODY MASS INDEX: 21.38 KG/M2 | HEIGHT: 59 IN | TEMPERATURE: 98 F | WEIGHT: 106.06 LBS | RESPIRATION RATE: 20 BRPM | DIASTOLIC BLOOD PRESSURE: 70 MMHG

## 2025-05-26 DIAGNOSIS — F51.04 CHRONIC INSOMNIA: ICD-10-CM

## 2025-05-26 DIAGNOSIS — R10.11 RIGHT UPPER QUADRANT PAIN: Primary | ICD-10-CM

## 2025-05-26 PROCEDURE — 3075F SYST BP GE 130 - 139MM HG: CPT | Mod: CPTII,,, | Performed by: NURSE PRACTITIONER

## 2025-05-26 PROCEDURE — 99999 PR PBB SHADOW E&M-EST. PATIENT-LVL IV: CPT | Mod: PBBFAC,,, | Performed by: NURSE PRACTITIONER

## 2025-05-26 PROCEDURE — 99213 OFFICE O/P EST LOW 20 MIN: CPT | Mod: S$PBB,,, | Performed by: NURSE PRACTITIONER

## 2025-05-26 PROCEDURE — 3078F DIAST BP <80 MM HG: CPT | Mod: CPTII,,, | Performed by: NURSE PRACTITIONER

## 2025-05-26 PROCEDURE — 3008F BODY MASS INDEX DOCD: CPT | Mod: CPTII,,, | Performed by: NURSE PRACTITIONER

## 2025-05-26 PROCEDURE — 99214 OFFICE O/P EST MOD 30 MIN: CPT | Mod: PBBFAC,PN | Performed by: NURSE PRACTITIONER

## 2025-05-26 PROCEDURE — 1159F MED LIST DOCD IN RCRD: CPT | Mod: CPTII,,, | Performed by: NURSE PRACTITIONER

## 2025-05-26 RX ORDER — ZOLPIDEM TARTRATE 5 MG/1
5 TABLET ORAL NIGHTLY
Qty: 30 TABLET | Refills: 2 | Status: SHIPPED | OUTPATIENT
Start: 2025-05-26 | End: 2025-08-24

## 2025-05-26 NOTE — PROGRESS NOTES
Patient ID: Tatyana Bello is a 60 y.o. female.    Chief Complaint: Follow-up (61 yo female here for 6 month recheck and consult about abnormal lab results. KM)    History of Present Illness    CHIEF COMPLAINT:  Ms. Bello presents to discuss recent lab results and reports right upper quadrant abdominal pain.    HPI:  Ms. Bello reports intermittent pain in the right upper quadrant of her abdomen, occurring twice today. She is currently asymptomatic. She had a CT of the stomach and right upper quadrant in the past. A recent pelvic ultrasound revealed fibroids. She underwent fibroid removal surgery 6 months ago, but a follow-up ultrasound showed fibroid regrowth. The current ultrasound shows two fibroids: one measuring 1.4 cm and another measuring 5 mm. Her uterus measures 7.24 mm, increased from 6.4 cm in an ultrasound from about a year ago. She is currently asymptomatic from the fibroids. She is considering hysterectomy due to rapid fibroid growth and family history of cancer but wishes to postpone the surgery until next year.    MEDICATIONS:  Ms. Bello is on Ambien.    MEDICAL HISTORY:  Ms. Bello has a history of fibroids, which are present and growing in size.    FAMILY HISTORY:  Family history is significant for sister with breast cancer that metastasized, and another sister with colon cancer who survived. Her brother had lung cancer and is . Ms. Bello's mother had breast cancer. Both her grandfather and grandmother had cancer, though the type was not specified.    SURGICAL HISTORY:  She underwent fibroid removal 6 months ago.    TEST RESULTS:  A recent CBC showed slightly low RBCs, with hemoglobin and hematocrit on the lower end of the normal range, indicative of mild microcytic anemia. The CMP was normal overall. Liver Function Tests revealed a low ALT at 7 (previous result), considered a benign finding. Kidney Function Tests showed normal BUN and creatinine, with a normal GFR. The Anion  Gap was outside the normal range, likely due to a lab miscalculation.    IMAGING:  Ms. Bello underwent a CT Abdomen in October of last year. A recent Pelvic Ultrasound revealed two fibroids, one measuring 1.4 cm and another 5 mm, with the uterus measuring 7.24 mm. A Pelvic Ultrasound from approximately one year ago showed a uterus measuring 6.4 cm and a fibroid measuring 10 x 9 x 9 mm.    SOCIAL HISTORY:  Marital status:       ROS:  General: -fever, -chills, -fatigue, -weight gain, -weight loss  Eyes: -vision changes, -redness, -discharge  ENT: -ear pain, -nasal congestion, -sore throat  Cardiovascular: -chest pain, -palpitations, -lower extremity edema  Respiratory: -cough, -shortness of breath  Gastrointestinal: +abdominal pain, -nausea, -vomiting, -diarrhea, -constipation, -blood in stool  Genitourinary: -dysuria, -hematuria, -frequency  Musculoskeletal: -joint pain, -muscle pain  Skin: -rash, -lesion  Neurological: -headache, -dizziness, -numbness, -tingling  Psychiatric: -anxiety, -depression, -sleep difficulty             Past Medical History:   Diagnosis Date    History of cardiac arrhythmia     pt states had tachy-arrhythmia 12 yrs ago; treated with cardiac ablation    Insomnia     Microscopic hematuria     Post-menopausal bleeding     Uterine mass     Wears contact lenses      Past Surgical History:   Procedure Laterality Date    BREAST BIOPSY Right     CARDIAC ELECTROPHYSIOLOGY STUDY AND ABLATION      DIAGNOSTIC LAPAROSCOPY N/A 12/19/2018    Procedure: LAPAROSCOPY, DIAGNOSTIC;  Surgeon: Christy Garcia MD;  Location: [facility];  Service: OB/GYN;  Laterality: N/A;    HYSTEROSCOPY WITH DILATION AND CURETTAGE OF UTERUS N/A 5/27/2024    Procedure: HYSTEROSCOPY, WITH DILATION AND CURETTAGE OF UTERUS;  Surgeon: Mira Caceres MD;  Location: [facility] OR;  Service: OB/GYN;  Laterality: N/A;  cutting time 2:36  deficit 215ml         Tobacco History:  reports that she has quit smoking. She has been  "exposed to tobacco smoke. She has never used smokeless tobacco.      Review of patient's allergies indicates:  No Known Allergies  Current Medications[1]           Objective:      Vitals:    05/26/25 1533   BP: 132/70   Pulse: 78   Resp: 20   Temp: 98 °F (36.7 °C)   TempSrc: Oral   Weight: 48.1 kg (106 lb 0.7 oz)   Height: 4' 11.02" (1.499 m)     Physical Exam  Constitutional:       Appearance: Normal appearance.   Cardiovascular:      Rate and Rhythm: Normal rate and regular rhythm.      Heart sounds: Normal heart sounds.   Pulmonary:      Effort: Pulmonary effort is normal.      Breath sounds: Normal breath sounds.   Abdominal:      General: Bowel sounds are normal.      Palpations: Abdomen is soft.   Musculoskeletal:         General: Normal range of motion.   Neurological:      Mental Status: She is alert and oriented to person, place, and time. Mental status is at baseline.   Psychiatric:         Mood and Affect: Mood normal.         Behavior: Behavior normal.           Assessment:       1. Right upper quadrant pain    2. Chronic insomnia           Plan:       Assessment & Plan    R10.11 Right upper quadrant pain  D25.1 Intramural leiomyoma of uterus  D50.8 Other iron deficiency anemias  R78.89 Finding of other specified substances, not normally found in blood  Z80.3 Family history of malignant neoplasm of breast  Z80.0 Family history of malignant neoplasm of digestive organs  Z80.1 Family history of malignant neoplasm of trachea, bronchus and lung    RIGHT UPPER QUADRANT PAIN:  - Monitored patient's right upper quadrant pain, which occurred twice today, staying briefly before resolving.  - Evaluated previous CT of the abdomen and determined further investigation is necessary.  - Ordered abdominal ultrasound despite previous normal CT.  - Informed patient that the imaging center will call to schedule the procedure.    UTERINE FIBROIDS:  - Monitored uterine fibroids, noting recent ultrasound showed growth since " surgery 6 months ago.  - Documented fibroid measurements of 1.4 cm and 5 mm, with uterus measuring 7.24 mm.  - Discussed that fibroids have increased in size but not doubled as previously reported.  - Advised patient to follow up with Dr. Lang regarding growth and potential surgical intervention options.  - Noted patient is considering postponing fibroid removal surgery until next year, as there are no signs of malignancy.    IRON DEFICIENCY ANEMIA:  - Monitored patient's RBC count, which is slightly low with hemoglobin and hematocrit values on the lower end of normal, consistent with mild microcytic anemia.  - Reviewed CBC results showing this is likely benign and potentially correctable.  - Explained CBC results in detail, including the significance of RBC count, hemoglobin, hematocrit, and indices (MCV, MCH, MCHC).  - Recommend dietary modifications or supplements to manage the condition.    ABNORMAL BLOOD FINDINGS:  - Monitored anion gap elevation, likely due to laboratory error or miscalculation.  - Assessed liver function tests (ALT, AST, ALP, bilirubin) as normal, with low ALT considered a benign finding.  - Evaluated renal function (GFR, BUN, creatinine) and electrolytes as normal.  - Described the function of platelets in hemostasis.    FAMILY HISTORY OF BREAST CANCER:  - Monitored patient's family history of breast cancer, specifically noting a sister with diagnosis.  - Ensured regular screenings for breast cancer are scheduled due to positive family history.    FAMILY HISTORY OF COLON CANCER:  - Monitored patient's family history of colon cancer, specifically noting a sister who survived the disease.  - Ensured regular screenings for colon cancer are scheduled due to positive family history.  - Clarified the roles of various organs in filtering substances in the body, including the liver, gallbladder, and kidneys.    FAMILY HISTORY OF LUNG CANCER:  - Monitored patient's family history of lung cancer,  specifically noting a brother who  from the disease.         Right upper quadrant pain  -     US Abdomen Complete; Future; Expected date: 2025    Chronic insomnia  -     zolpidem (AMBIEN) 5 MG Tab; Take 1 tablet (5 mg total) by mouth every evening.  Dispense: 30 tablet; Refill: 2      Follow up in about 3 months (around 2025).        2025 Funmilayo Soto NP    This note was generated with the assistance of ambient listening technology. Verbal consent was obtained by the patient and accompanying visitor(s) for the recording of patient appointment to facilitate this note. I attest to having reviewed and edited the generated note for accuracy, though some syntax or spelling errors may persist. Please contact the author of this note for any clarification.             [1]   Current Outpatient Medications:     estradioL (VAGIFEM) 10 mcg Tab, Insert one tablet in the vagina every night for 2 weeks for initiation therapy., Disp: 14 tablet, Rfl: 0    estrogen, conjugated,-medroxyprogesterone 0.3-1.5 mg (PREMPRO) 0.3-1.5 mg per tablet, Prempro 0.3 mg-1.5 mg tablet  Take 1 tablet every day by oral route., Disp: 90 tablet, Rfl: 3    traZODone (DESYREL) 100 MG tablet, Take 1 tablet by mouth in the evening, Disp: 30 tablet, Rfl: 0    triamcinolone acetonide 0.1% (KENALOG) 0.1 % cream, Apply topically 2 (two) times daily., Disp: , Rfl:     ammonium lactate (LAC-HYDRIN) 12 % lotion, Apply topically. (Patient not taking: Reported on 2025), Disp: , Rfl:     ibuprofen (ADVIL,MOTRIN) 800 MG tablet, Take 1 tablet (800 mg total) by mouth every 8 (eight) hours as needed for Pain. (Patient not taking: Reported on 2025), Disp: 30 tablet, Rfl: 0    oxyCODONE-acetaminophen (PERCOCET) 5-325 mg per tablet, Take 1 tablet by mouth every 4 (four) hours as needed for Pain. (Patient not taking: Reported on 2025), Disp: 4 tablet, Rfl: 0    phentermine (ADIPEX-P) 37.5 mg tablet, Take 37.5 mg by mouth every  morning. (Patient not taking: Reported on 5/26/2025), Disp: , Rfl:     RETIN-A 0.1 % cream, , Disp: , Rfl:     zolpidem (AMBIEN) 5 MG Tab, Take 1 tablet (5 mg total) by mouth every evening., Disp: 30 tablet, Rfl: 2

## 2025-05-31 ENCOUNTER — HOSPITAL ENCOUNTER (OUTPATIENT)
Dept: RADIOLOGY | Facility: HOSPITAL | Age: 60
Discharge: HOME | End: 2025-05-31
Attending: NURSE PRACTITIONER
Payer: MEDICAID

## 2025-05-31 DIAGNOSIS — R10.11 RIGHT UPPER QUADRANT PAIN: ICD-10-CM

## 2025-05-31 PROCEDURE — 76700 US EXAM ABDOM COMPLETE: CPT | Mod: TC

## 2025-05-31 PROCEDURE — 76700 US EXAM ABDOM COMPLETE: CPT | Mod: 26,,, | Performed by: RADIOLOGY

## 2025-06-09 DIAGNOSIS — F51.04 CHRONIC INSOMNIA: ICD-10-CM

## 2025-06-09 RX ORDER — TRAZODONE HYDROCHLORIDE 100 MG/1
100 TABLET ORAL NIGHTLY
Qty: 30 TABLET | Refills: 0 | Status: SHIPPED | OUTPATIENT
Start: 2025-06-09

## 2025-06-13 DIAGNOSIS — Z78.0 MENOPAUSE: ICD-10-CM

## 2025-06-23 ENCOUNTER — RESULTS FOLLOW-UP (OUTPATIENT)
Dept: FAMILY MEDICINE | Facility: CLINIC | Age: 60
End: 2025-06-23

## 2025-06-30 ENCOUNTER — OFFICE VISIT (OUTPATIENT)
Dept: OBSTETRICS AND GYNECOLOGY | Facility: CLINIC | Age: 60
End: 2025-06-30
Payer: MEDICAID

## 2025-06-30 VITALS
WEIGHT: 105.63 LBS | HEIGHT: 59 IN | SYSTOLIC BLOOD PRESSURE: 110 MMHG | BODY MASS INDEX: 21.29 KG/M2 | DIASTOLIC BLOOD PRESSURE: 66 MMHG

## 2025-06-30 DIAGNOSIS — N95.0 PMB (POSTMENOPAUSAL BLEEDING): Primary | ICD-10-CM

## 2025-06-30 DIAGNOSIS — D25.2 SUBSEROUS LEIOMYOMA OF UTERUS: ICD-10-CM

## 2025-06-30 PROCEDURE — 99213 OFFICE O/P EST LOW 20 MIN: CPT | Mod: PBBFAC,PO | Performed by: OBSTETRICS & GYNECOLOGY

## 2025-06-30 PROCEDURE — 1159F MED LIST DOCD IN RCRD: CPT | Mod: CPTII,,, | Performed by: OBSTETRICS & GYNECOLOGY

## 2025-06-30 PROCEDURE — 3008F BODY MASS INDEX DOCD: CPT | Mod: CPTII,,, | Performed by: OBSTETRICS & GYNECOLOGY

## 2025-06-30 PROCEDURE — 3078F DIAST BP <80 MM HG: CPT | Mod: CPTII,,, | Performed by: OBSTETRICS & GYNECOLOGY

## 2025-06-30 PROCEDURE — 99214 OFFICE O/P EST MOD 30 MIN: CPT | Mod: S$PBB,,, | Performed by: OBSTETRICS & GYNECOLOGY

## 2025-06-30 PROCEDURE — 3074F SYST BP LT 130 MM HG: CPT | Mod: CPTII,,, | Performed by: OBSTETRICS & GYNECOLOGY

## 2025-06-30 PROCEDURE — 99999 PR PBB SHADOW E&M-EST. PATIENT-LVL III: CPT | Mod: PBBFAC,,, | Performed by: OBSTETRICS & GYNECOLOGY

## 2025-06-30 NOTE — PROGRESS NOTES
[facility] Note    Pertinent Med & GYN Problem List      Subjective:   Chief Complaint:  Consult (Discuss hysterectomy)    Date: 2025     Patient ID: Tatyana Bello is a  60 y.o. female.    HRT:  Prempro and Vagifem    Patient's last menstrual period was 2025 (approximate).    The patient presents today due to the following:    Despite changes in hormone replacement therapy and previous D and C the patient continues to have issues with irregular postmenopausal bleeding as well as postcoital bleeding.      She presents today to discuss further evaluation and potential treatment options.    GYN & OB History  LMP: Patient's last menstrual period was 2025 (approximate).     Age of Menarche:15  Age at first pregnancy:    Age at first live birth:31  Number of months breastfeeding:    Age at Menopause:57   Comments:     OB History          2    Para   1    Term   1            AB   1    Living   1         SAB   1    IAB        Ectopic        Multiple        Live Births               Obstetric Comments   Vaginal delivery x1 and SAB x1               Allergies: Review of patient's allergies indicates:  No Known Allergies    Past Medical History:   Diagnosis Date    History of cardiac arrhythmia     pt states had tachy-arrhythmia 12 yrs ago; treated with cardiac ablation    Insomnia     Microscopic hematuria     Post-menopausal bleeding     Uterine mass     Wears contact lenses        Past Surgical History:   Procedure Laterality Date    BREAST BIOPSY Right     CARDIAC ELECTROPHYSIOLOGY STUDY AND ABLATION      DIAGNOSTIC LAPAROSCOPY N/A 2018    Procedure: LAPAROSCOPY, DIAGNOSTIC;  Surgeon: Christy Garcia MD;  Location: [facility];  Service: OB/GYN;  Laterality: N/A;    HYSTEROSCOPY WITH DILATION AND CURETTAGE OF UTERUS N/A 2024    Procedure: HYSTEROSCOPY, WITH DILATION AND CURETTAGE OF UTERUS;  Surgeon: Mira Caceres MD;  Location: [facility]  OR;  Service: OB/GYN;  Laterality: N/A;  cutting time 2:36  deficit 215ml       Medications  Current Medications[1]     Social History[2]    Family History   Problem Relation Name Age of Onset    Diabetes Mother      Hypertension Mother      Stroke Father      Breast cancer Sister      Bone cancer Sister      Stroke Maternal Grandmother      Stroke Maternal Grandfather      Breast cancer Paternal Grandmother      Colon cancer Brother         Review of Systems (at today's evaluation)  Review of Systems   Constitutional:  Negative for fever and unexpected weight change.   Respiratory:  Negative for cough and shortness of breath.    Cardiovascular:  Negative for chest pain and palpitations.   Gastrointestinal:  Negative for constipation, diarrhea, nausea and vomiting.   Genitourinary:  Negative for dysuria, frequency, hematuria and urgency.        Gyn as per HPI   Neurological:  Negative for headaches.        Objective:      Vitals:    06/30/25 1615   BP: 110/66     Physical Exam:   Constitutional: She appears well-developed and well-nourished. No distress.    HENT:   Head: Normocephalic.     Neck: No thyroid mass present.    Cardiovascular:  Normal rate.             Pulmonary/Chest: Effort normal. No respiratory distress.        Abdominal: Soft. There is no abdominal tenderness.     Genitourinary:    Inguinal canal, vagina, uterus, right adnexa and left adnexa normal.      Pelvic exam was performed with patient supine.   The external female genitalia was normal.   No external genitalia lesions identified,Cervix is normal. Right adnexum displays no mass and no tenderness. Left adnexum displays no mass and no tenderness. No tenderness or bleeding in the vagina. Uterus is not tender. Normal urethral meatus.Urethra findings: no tendernessBladder findings: no bladder tenderness   Genitourinary Comments: A chaperone (female medical assistant) was present throughout the pelvic exam.             Musculoskeletal: Normal range  of motion.      Right lower leg: No edema.      Left lower leg: No edema.      Lymphadenopathy: No inguinal adenopathy noted on the right or left side.    Neurological: She is alert.    Skin: Skin is warm and dry.    Psychiatric: She has a normal mood and affect. Mood normal.         SURGICAL PATHOLOGY 5/27/24  1. ENDOMETRIAL CURETTINGS: - FRAGMENTS OF BENIGN INACTIVE ENDOMETRIUM AND STRIPS OF BENIGN SQUAMOUS EPITHELIUM.   2. VAGINAL (should be intra-uterine) MASS: - LEIOMYOMA AND FRAGMENTS OF INACTIVE ENDOMETRIUM.      3/26/2025 Routine     Narrative & Impression  EXAMINATION:  US PELVIS COMP WITH TRANSVAG NON-OB (XPD)     CLINICAL HISTORY:  60yo female on HRT with resection of large intrauterine fibroid in MAY 2024, still having light postmenopausal bleeding. Please evaluate pelvic anatomy.; Encounter for gynecological examination (general) (routine) without abnormal findings     FINDINGS:  Uterus:     Size: 7.2 x 4.4 cm     Masses: There is a  1.4 cm solid nodule in the lumen of the uterus.  This could represent a polyp or a sub mucosal fibroid.  There is a 5 mm fibroid in the myometrium.     Right ovary:     Size: 1.8 x 1.5 cm     Appearance: Normal     Vascular flow: Normal.     Left ovary:     Size: 1.8 x 1.5 cm     Appearance: Normal     Vascular Flow: Normal.     Free Fluid:     None.     Impression:     There is a solid nodule in the lumen of the uterus measuring 1.4 cm.  This may represent a polyp or a submucosal fibroid.  There is a 5 mm myometrial fibroid    Assessment:        1. PMB (postmenopausal bleeding)    2. Subserous leiomyoma of uterus        Plan:      PMB (postmenopausal bleeding)    Subserous leiomyoma of uterus       Follow up for as needed / for any GYN related issues.     The above was reviewed discussed with the patient.  We reviewed the patient's gynecologic history, gynecologic surgery and current medications   Conservative medical and surgical intervention for her persistent bleeding  issues were discussed.    At this time the patient is strongly leaning towards definitive therapy via hysterectomy but would like to proceed with this in January of 2026.  She will follow up for any worsening bleeding or other gynecologic issues.  She will otherwise follow up in January for re-evaluation and to further discuss treatment options.    The patient's questions were answered, and she is in agreement with the current plan.     Omar Salmon MD  Department OBGYN [facility]         [1]   Current Outpatient Medications:     estradioL (VAGIFEM) 10 mcg Tab, Insert one tablet in the vagina every night for 2 weeks for initiation therapy., Disp: 14 tablet, Rfl: 0    estrogen, conjugated,-medroxyprogesterone 0.3-1.5 mg (PREMPRO) 0.3-1.5 mg per tablet, Prempro 0.3 mg-1.5 mg tablet  Take 1 tablet every day by oral route., Disp: 90 tablet, Rfl: 3    triamcinolone acetonide 0.1% (KENALOG) 0.1 % cream, Apply topically 2 (two) times daily., Disp: , Rfl:     zolpidem (AMBIEN) 5 MG Tab, Take 1 tablet (5 mg total) by mouth every evening., Disp: 30 tablet, Rfl: 2    ammonium lactate (LAC-HYDRIN) 12 % lotion, Apply topically. (Patient not taking: Reported on 6/30/2025), Disp: , Rfl:     ibuprofen (ADVIL,MOTRIN) 800 MG tablet, Take 1 tablet (800 mg total) by mouth every 8 (eight) hours as needed for Pain. (Patient not taking: Reported on 6/30/2025), Disp: 30 tablet, Rfl: 0    oxyCODONE-acetaminophen (PERCOCET) 5-325 mg per tablet, Take 1 tablet by mouth every 4 (four) hours as needed for Pain. (Patient not taking: Reported on 6/30/2025), Disp: 4 tablet, Rfl: 0    phentermine (ADIPEX-P) 37.5 mg tablet, Take 37.5 mg by mouth every morning. (Patient not taking: Reported on 6/30/2025), Disp: , Rfl:     RETIN-A 0.1 % cream, , Disp: , Rfl:     traZODone (DESYREL) 100 MG tablet, Take 1 tablet by mouth in the evening, Disp: 30 tablet, Rfl: 0  [2]   Social History  Tobacco Use    Smoking status: Former     Passive exposure: Past     Smokeless tobacco: Never   Substance Use Topics    Alcohol use: Yes    Drug use: No

## 2025-07-05 DIAGNOSIS — F51.04 CHRONIC INSOMNIA: ICD-10-CM

## 2025-07-07 RX ORDER — TRAZODONE HYDROCHLORIDE 100 MG/1
100 TABLET ORAL NIGHTLY
Qty: 30 TABLET | Refills: 0 | Status: SHIPPED | OUTPATIENT
Start: 2025-07-07

## 2025-08-01 DIAGNOSIS — F51.04 CHRONIC INSOMNIA: ICD-10-CM

## 2025-08-04 RX ORDER — TRAZODONE HYDROCHLORIDE 100 MG/1
100 TABLET ORAL NIGHTLY
Qty: 30 TABLET | Refills: 0 | Status: SHIPPED | OUTPATIENT
Start: 2025-08-04

## 2025-08-30 DIAGNOSIS — F51.04 CHRONIC INSOMNIA: ICD-10-CM

## 2025-09-02 RX ORDER — TRAZODONE HYDROCHLORIDE 100 MG/1
100 TABLET ORAL NIGHTLY
Qty: 30 TABLET | Refills: 0 | Status: SHIPPED | OUTPATIENT
Start: 2025-09-02

## (undated) DEVICE — TUBING PNEUMO

## (undated) DEVICE — SEE MEDLINE ITEM 152651

## (undated) DEVICE — PACK LAPAROSCOPY TIBURON

## (undated) DEVICE — GOWN POLY REINF BRTH SLV LG

## (undated) DEVICE — SOL WATER STRL IRR 1000ML

## (undated) DEVICE — PAD SANITARY OB STERILE

## (undated) DEVICE — ITEM INACTIVATED - ERP

## (undated) DEVICE — SEE MEDLINE ITEM 157116

## (undated) DEVICE — Device

## (undated) DEVICE — KIT ANTIFOG

## (undated) DEVICE — SPONGE GAUZE 16PLY 4X4

## (undated) DEVICE — GLOVE BIOGEL ECLIPSE SZ 6

## (undated) DEVICE — SEAL LENS SCOPE MYOSURE

## (undated) DEVICE — COVER SURG LIGHT HANDLE

## (undated) DEVICE — TROCAR SURG BLD NONTHRD 11X100

## (undated) DEVICE — SOL NACL IRR 3000ML

## (undated) DEVICE — SEE L#133928

## (undated) DEVICE — SEE MEDLINE ITEM 146292

## (undated) DEVICE — JELLY SURGILUBE LUBE TUBE 2OZ

## (undated) DEVICE — BANDAGE ADHESIVE

## (undated) DEVICE — GLOVE BIOGEL SKINSENSE PI 6.0

## (undated) DEVICE — SEE L#120831

## (undated) DEVICE — PACK FLUENT DISPOSABLE

## (undated) DEVICE — GLOVE SURG ULTRA TOUCH 7

## (undated) DEVICE — SUT 3-0 VICRYL / FS-2

## (undated) DEVICE — TOWEL OR DISP STRL BLUE 4/PK

## (undated) DEVICE — WATER STERILE INJ 500ML BAG

## (undated) DEVICE — DRAPE MINOR PROCEDURE

## (undated) DEVICE — SEE MEDLINE ITEM 157131

## (undated) DEVICE — PAD UNDERPAD 30X30

## (undated) DEVICE — BLADE SURG CARBON STEEL SZ11

## (undated) DEVICE — SOL NORMAL USPCA 0.9%

## (undated) DEVICE — SHEET DRAPE MEDIUM

## (undated) DEVICE — SET CYSTO IRRIGATION UNIV SPIK

## (undated) DEVICE — DEVICE MYOSURE REACH

## (undated) DEVICE — TRAY DRY SPONGE SCRUB W FOAM

## (undated) DEVICE — NDL PNEUMO INSUFFLATI 120MM

## (undated) DEVICE — GLOVE SENSICARE PI GRN 6.5

## (undated) DEVICE — SYR 10CC LUER LOCK

## (undated) DEVICE — JELLY LUBRICATING STERILE 5 GR

## (undated) DEVICE — DRAPE UINDERBUT GRAD PCH

## (undated) DEVICE — SOCK FLUENT TISSUE FLUID MGMT

## (undated) DEVICE — SEE MEDLINE ITEM 146355

## (undated) DEVICE — SEE MEDLINE ITEM 152680

## (undated) DEVICE — SCRUB HIBICLENS 4% CHG 4OZ